# Patient Record
Sex: MALE | Race: WHITE | Employment: UNEMPLOYED | URBAN - METROPOLITAN AREA
[De-identification: names, ages, dates, MRNs, and addresses within clinical notes are randomized per-mention and may not be internally consistent; named-entity substitution may affect disease eponyms.]

---

## 2017-02-18 ENCOUNTER — HOSPITAL ENCOUNTER (INPATIENT)
Age: 52
LOS: 3 days | Discharge: HOME | DRG: 542 | End: 2017-02-21
Attending: EMERGENCY MEDICINE | Admitting: INTERNAL MEDICINE
Payer: SELF-PAY

## 2017-02-18 ENCOUNTER — APPOINTMENT (OUTPATIENT)
Dept: GENERAL RADIOLOGY | Age: 52
DRG: 542 | End: 2017-02-18
Attending: EMERGENCY MEDICINE
Payer: SELF-PAY

## 2017-02-18 ENCOUNTER — APPOINTMENT (OUTPATIENT)
Dept: CT IMAGING | Age: 52
DRG: 542 | End: 2017-02-18
Attending: EMERGENCY MEDICINE
Payer: SELF-PAY

## 2017-02-18 ENCOUNTER — APPOINTMENT (OUTPATIENT)
Dept: CT IMAGING | Age: 52
DRG: 542 | End: 2017-02-18
Attending: INTERNAL MEDICINE
Payer: SELF-PAY

## 2017-02-18 DIAGNOSIS — M84.454A PATHOLOGIC ACETABULAR FRACTURE, INITIAL ENCOUNTER: Primary | ICD-10-CM

## 2017-02-18 DIAGNOSIS — I10 ACCELERATED ESSENTIAL HYPERTENSION: ICD-10-CM

## 2017-02-18 PROBLEM — Z72.0 TOBACCO ABUSE: Status: ACTIVE | Noted: 2017-02-18

## 2017-02-18 PROBLEM — M84.552A PATHOLOGICAL FRACTURE OF LEFT HIP DUE TO NEOPLASTIC DISEASE (HCC): Status: ACTIVE | Noted: 2017-02-18

## 2017-02-18 LAB
ALBUMIN SERPL BCP-MCNC: 2.9 G/DL (ref 3.5–5)
ALBUMIN/GLOB SERPL: 0.6 {RATIO} (ref 1.2–3.5)
ALP SERPL-CCNC: 216 U/L (ref 50–136)
ALT SERPL-CCNC: 46 U/L (ref 12–65)
ANION GAP BLD CALC-SCNC: 12 MMOL/L (ref 7–16)
AST SERPL W P-5'-P-CCNC: 66 U/L (ref 15–37)
BACTERIA SPEC CULT: NORMAL
BASOPHILS # BLD AUTO: 0 K/UL (ref 0–0.2)
BASOPHILS # BLD: 0 % (ref 0–2)
BILIRUB SERPL-MCNC: 0.5 MG/DL (ref 0.2–1.1)
BUN SERPL-MCNC: 16 MG/DL (ref 6–23)
CALCIUM SERPL-MCNC: 9 MG/DL (ref 8.3–10.4)
CHLORIDE SERPL-SCNC: 106 MMOL/L (ref 98–107)
CO2 SERPL-SCNC: 24 MMOL/L (ref 21–32)
CREAT SERPL-MCNC: 0.69 MG/DL (ref 0.8–1.5)
DIFFERENTIAL METHOD BLD: ABNORMAL
EOSINOPHIL # BLD: 0 K/UL (ref 0–0.8)
EOSINOPHIL NFR BLD: 0 % (ref 0.5–7.8)
ERYTHROCYTE [DISTWIDTH] IN BLOOD BY AUTOMATED COUNT: 15.6 % (ref 11.9–14.6)
GLOBULIN SER CALC-MCNC: 4.5 G/DL (ref 2.3–3.5)
GLUCOSE SERPL-MCNC: 156 MG/DL (ref 65–100)
HCT VFR BLD AUTO: 35.2 % (ref 41.1–50.3)
HGB BLD-MCNC: 11.3 G/DL (ref 13.6–17.2)
IMM GRANULOCYTES # BLD: 0.1 K/UL (ref 0–0.5)
LYMPHOCYTES # BLD AUTO: 11 % (ref 13–44)
LYMPHOCYTES # BLD: 1.6 K/UL (ref 0.5–4.6)
MCH RBC QN AUTO: 30.2 PG (ref 26.1–32.9)
MCHC RBC AUTO-ENTMCNC: 32.1 G/DL (ref 31.4–35)
MCV RBC AUTO: 94.1 FL (ref 79.6–97.8)
MONOCYTES # BLD: 1 K/UL (ref 0.1–1.3)
MONOCYTES NFR BLD AUTO: 7 % (ref 4–12)
NEUTS SEG # BLD: 12.1 K/UL (ref 1.7–8.2)
NEUTS SEG NFR BLD AUTO: 82 % (ref 43–78)
PLATELET # BLD AUTO: 239 K/UL (ref 150–450)
PLATELET COMMENTS,PCOM: ADEQUATE
PMV BLD AUTO: 9.9 FL (ref 10.8–14.1)
POTASSIUM SERPL-SCNC: 3.3 MMOL/L (ref 3.5–5.1)
PROT SERPL-MCNC: 7.4 G/DL (ref 6.3–8.2)
RBC # BLD AUTO: 3.74 M/UL (ref 4.23–5.67)
RBC MORPH BLD: ABNORMAL
SERVICE CMNT-IMP: NORMAL
SODIUM SERPL-SCNC: 142 MMOL/L (ref 136–145)
WBC # BLD AUTO: 14.7 K/UL (ref 4.3–11.1)
WBC MORPH BLD: ABNORMAL

## 2017-02-18 PROCEDURE — 71260 CT THORAX DX C+: CPT

## 2017-02-18 PROCEDURE — 99284 EMERGENCY DEPT VISIT MOD MDM: CPT | Performed by: EMERGENCY MEDICINE

## 2017-02-18 PROCEDURE — 85025 COMPLETE CBC W/AUTO DIFF WBC: CPT | Performed by: EMERGENCY MEDICINE

## 2017-02-18 PROCEDURE — 96361 HYDRATE IV INFUSION ADD-ON: CPT | Performed by: EMERGENCY MEDICINE

## 2017-02-18 PROCEDURE — 65610000001 HC ROOM ICU GENERAL

## 2017-02-18 PROCEDURE — 73502 X-RAY EXAM HIP UNI 2-3 VIEWS: CPT

## 2017-02-18 PROCEDURE — 73700 CT LOWER EXTREMITY W/O DYE: CPT

## 2017-02-18 PROCEDURE — 74011250636 HC RX REV CODE- 250/636: Performed by: EMERGENCY MEDICINE

## 2017-02-18 PROCEDURE — 80053 COMPREHEN METABOLIC PANEL: CPT | Performed by: EMERGENCY MEDICINE

## 2017-02-18 PROCEDURE — 74011636320 HC RX REV CODE- 636/320: Performed by: INTERNAL MEDICINE

## 2017-02-18 PROCEDURE — 74011000250 HC RX REV CODE- 250: Performed by: EMERGENCY MEDICINE

## 2017-02-18 PROCEDURE — 74011000258 HC RX REV CODE- 258: Performed by: INTERNAL MEDICINE

## 2017-02-18 PROCEDURE — 87641 MR-STAPH DNA AMP PROBE: CPT | Performed by: INTERNAL MEDICINE

## 2017-02-18 PROCEDURE — 96375 TX/PRO/DX INJ NEW DRUG ADDON: CPT | Performed by: EMERGENCY MEDICINE

## 2017-02-18 PROCEDURE — 71010 XR CHEST PORT: CPT

## 2017-02-18 PROCEDURE — 74011000250 HC RX REV CODE- 250: Performed by: INTERNAL MEDICINE

## 2017-02-18 PROCEDURE — 96374 THER/PROPH/DIAG INJ IV PUSH: CPT | Performed by: EMERGENCY MEDICINE

## 2017-02-18 PROCEDURE — 74011250637 HC RX REV CODE- 250/637: Performed by: INTERNAL MEDICINE

## 2017-02-18 PROCEDURE — 74011250636 HC RX REV CODE- 250/636: Performed by: INTERNAL MEDICINE

## 2017-02-18 PROCEDURE — 96376 TX/PRO/DX INJ SAME DRUG ADON: CPT | Performed by: EMERGENCY MEDICINE

## 2017-02-18 RX ORDER — HYDROMORPHONE HYDROCHLORIDE 1 MG/ML
1 INJECTION, SOLUTION INTRAMUSCULAR; INTRAVENOUS; SUBCUTANEOUS
Status: COMPLETED | OUTPATIENT
Start: 2017-02-18 | End: 2017-02-18

## 2017-02-18 RX ORDER — ENOXAPARIN SODIUM 100 MG/ML
40 INJECTION SUBCUTANEOUS EVERY 24 HOURS
Status: DISCONTINUED | OUTPATIENT
Start: 2017-02-18 | End: 2017-02-19

## 2017-02-18 RX ORDER — HYDRALAZINE HYDROCHLORIDE 20 MG/ML
20 INJECTION INTRAMUSCULAR; INTRAVENOUS
Status: COMPLETED | OUTPATIENT
Start: 2017-02-18 | End: 2017-02-18

## 2017-02-18 RX ORDER — HYDROMORPHONE HYDROCHLORIDE 1 MG/ML
1.5 INJECTION, SOLUTION INTRAMUSCULAR; INTRAVENOUS; SUBCUTANEOUS
Status: DISCONTINUED | OUTPATIENT
Start: 2017-02-18 | End: 2017-02-18

## 2017-02-18 RX ORDER — SODIUM CHLORIDE 0.9 % (FLUSH) 0.9 %
5-10 SYRINGE (ML) INJECTION AS NEEDED
Status: DISCONTINUED | OUTPATIENT
Start: 2017-02-18 | End: 2017-02-21 | Stop reason: HOSPADM

## 2017-02-18 RX ORDER — OXYCODONE AND ACETAMINOPHEN 10; 325 MG/1; MG/1
1 TABLET ORAL
Status: DISCONTINUED | OUTPATIENT
Start: 2017-02-18 | End: 2017-02-20

## 2017-02-18 RX ORDER — AMLODIPINE BESYLATE 5 MG/1
5 TABLET ORAL DAILY
Status: DISCONTINUED | OUTPATIENT
Start: 2017-02-18 | End: 2017-02-21 | Stop reason: HOSPADM

## 2017-02-18 RX ORDER — CLONIDINE HYDROCHLORIDE 0.1 MG/1
0.2 TABLET ORAL
Status: DISCONTINUED | OUTPATIENT
Start: 2017-02-18 | End: 2017-02-18

## 2017-02-18 RX ORDER — ONDANSETRON 2 MG/ML
4 INJECTION INTRAMUSCULAR; INTRAVENOUS
Status: COMPLETED | OUTPATIENT
Start: 2017-02-18 | End: 2017-02-18

## 2017-02-18 RX ORDER — SODIUM CHLORIDE 0.9 % (FLUSH) 0.9 %
5-10 SYRINGE (ML) INJECTION EVERY 8 HOURS
Status: DISCONTINUED | OUTPATIENT
Start: 2017-02-18 | End: 2017-02-21 | Stop reason: HOSPADM

## 2017-02-18 RX ORDER — LABETALOL HYDROCHLORIDE 5 MG/ML
20 INJECTION, SOLUTION INTRAVENOUS
Status: COMPLETED | OUTPATIENT
Start: 2017-02-18 | End: 2017-02-18

## 2017-02-18 RX ORDER — HYDROCODONE BITARTRATE AND ACETAMINOPHEN 7.5; 325 MG/1; MG/1
2 TABLET ORAL
COMMUNITY
End: 2017-02-21

## 2017-02-18 RX ORDER — SODIUM CHLORIDE 0.9 % (FLUSH) 0.9 %
10 SYRINGE (ML) INJECTION
Status: COMPLETED | OUTPATIENT
Start: 2017-02-18 | End: 2017-02-18

## 2017-02-18 RX ORDER — HYDROMORPHONE HYDROCHLORIDE 1 MG/ML
3 INJECTION, SOLUTION INTRAMUSCULAR; INTRAVENOUS; SUBCUTANEOUS
Status: DISCONTINUED | OUTPATIENT
Start: 2017-02-18 | End: 2017-02-19 | Stop reason: ALTCHOICE

## 2017-02-18 RX ADMIN — ENOXAPARIN SODIUM 40 MG: 40 INJECTION SUBCUTANEOUS at 19:21

## 2017-02-18 RX ADMIN — HYDROMORPHONE HYDROCHLORIDE 1 MG: 1 INJECTION, SOLUTION INTRAMUSCULAR; INTRAVENOUS; SUBCUTANEOUS at 16:02

## 2017-02-18 RX ADMIN — Medication 10 ML: at 22:00

## 2017-02-18 RX ADMIN — OXYCODONE HYDROCHLORIDE AND ACETAMINOPHEN 1 TABLET: 10; 325 TABLET ORAL at 19:28

## 2017-02-18 RX ADMIN — DIATRIZOATE MEGLUMINE AND DIATRIZOATE SODIUM 15 ML: 600; 100 SOLUTION ORAL; RECTAL at 18:57

## 2017-02-18 RX ADMIN — HYDRALAZINE HYDROCHLORIDE 20 MG: 20 INJECTION INTRAMUSCULAR; INTRAVENOUS at 15:19

## 2017-02-18 RX ADMIN — Medication 10 ML: at 18:39

## 2017-02-18 RX ADMIN — Medication 10 ML: at 19:22

## 2017-02-18 RX ADMIN — HYDROMORPHONE HYDROCHLORIDE 1.5 MG: 1 INJECTION, SOLUTION INTRAMUSCULAR; INTRAVENOUS; SUBCUTANEOUS at 18:34

## 2017-02-18 RX ADMIN — HYDROMORPHONE HYDROCHLORIDE 1 MG: 1 INJECTION, SOLUTION INTRAMUSCULAR; INTRAVENOUS; SUBCUTANEOUS at 17:01

## 2017-02-18 RX ADMIN — ONDANSETRON 4 MG: 2 INJECTION INTRAMUSCULAR; INTRAVENOUS at 12:49

## 2017-02-18 RX ADMIN — AMLODIPINE BESYLATE 5 MG: 5 TABLET ORAL at 19:21

## 2017-02-18 RX ADMIN — LABETALOL HYDROCHLORIDE 20 MG: 5 INJECTION, SOLUTION INTRAVENOUS at 17:21

## 2017-02-18 RX ADMIN — SODIUM CHLORIDE 100 ML: 900 INJECTION, SOLUTION INTRAVENOUS at 20:30

## 2017-02-18 RX ADMIN — Medication 10 ML: at 20:30

## 2017-02-18 RX ADMIN — HYDROMORPHONE HYDROCHLORIDE 1 MG: 1 INJECTION, SOLUTION INTRAMUSCULAR; INTRAVENOUS; SUBCUTANEOUS at 12:51

## 2017-02-18 RX ADMIN — SODIUM CHLORIDE 1000 ML: 900 INJECTION, SOLUTION INTRAVENOUS at 15:19

## 2017-02-18 RX ADMIN — HYDROMORPHONE HYDROCHLORIDE 1.5 MG: 1 INJECTION, SOLUTION INTRAMUSCULAR; INTRAVENOUS; SUBCUTANEOUS at 20:16

## 2017-02-18 RX ADMIN — IOVERSOL 100 ML: 741 INJECTION INTRA-ARTERIAL; INTRAVENOUS at 20:29

## 2017-02-18 RX ADMIN — HYDROMORPHONE HYDROCHLORIDE 1.5 MG: 1 INJECTION, SOLUTION INTRAMUSCULAR; INTRAVENOUS; SUBCUTANEOUS at 21:00

## 2017-02-18 RX ADMIN — SODIUM CHLORIDE 5 MG/HR: 900 INJECTION, SOLUTION INTRAVENOUS at 19:21

## 2017-02-18 NOTE — ED PROVIDER NOTES
Patient is a 47 y.o. male presenting with hip pain. The history is provided by the patient. Hip Injury    This is a new problem. The current episode started less than 1 hour ago. The problem occurs constantly. The problem has not changed since onset. The pain is present in the left hip. The quality of the pain is described as aching, sharp and constant. The pain is at a severity of 10/10. Associated symptoms include limited range of motion and stiffness. Pertinent negatives include no numbness, no tingling, no itching, no back pain and no neck pain. The symptoms are aggravated by movement and palpation. He has tried rest (morphine 5 mg IV per EMS) for the symptoms. The treatment provided no relief. There has been no history of extremity trauma. History reviewed. No pertinent past medical history. History reviewed. No pertinent past surgical history. History reviewed. No pertinent family history. Social History     Social History    Marital status: SINGLE     Spouse name: N/A    Number of children: N/A    Years of education: N/A     Occupational History    Not on file. Social History Main Topics    Smoking status: Current Every Day Smoker     Packs/day: 1.00    Smokeless tobacco: Not on file    Alcohol use No    Drug use: Not on file    Sexual activity: Not on file     Other Topics Concern    Not on file     Social History Narrative    No narrative on file         ALLERGIES: Review of patient's allergies indicates no known allergies. Review of Systems   Constitutional: Positive for appetite change and unexpected weight change. Negative for chills and fever. Musculoskeletal: Positive for arthralgias, myalgias and stiffness. Negative for back pain, neck pain and neck stiffness. Skin: Negative for itching. Neurological: Negative for tingling and numbness. All other systems reviewed and are negative.       Vitals:    02/18/17 1134 02/18/17 1252 02/18/17 1254   BP: (!) 239/105 (!) 238/102   Pulse: (!) 108 (!) 101    Resp: 24 22    Temp: 98.4 °F (36.9 °C)     SpO2: 100% 100%    Weight: 63.5 kg (140 lb)     Height: 5' 10\" (1.778 m)              Physical Exam   Constitutional: He is oriented to person, place, and time. He appears well-developed and well-nourished. He appears distressed. HENT:   Head: Normocephalic and atraumatic. Right Ear: Tympanic membrane and external ear normal.   Left Ear: Tympanic membrane and external ear normal.   Mouth/Throat: Oropharynx is clear and moist.   Eyes: Conjunctivae and EOM are normal. Pupils are equal, round, and reactive to light. Neck: Normal range of motion. Neck supple. No tracheal deviation present. Cardiovascular: Normal rate, regular rhythm, normal heart sounds and intact distal pulses. Exam reveals no gallop and no friction rub. No murmur heard. Pulmonary/Chest: Effort normal and breath sounds normal. No respiratory distress. He has no wheezes. Abdominal: Soft. Bowel sounds are normal. He exhibits no distension and no mass. There is no hepatosplenomegaly. There is no tenderness. There is no rebound and no guarding. Musculoskeletal: He exhibits no edema. Right shoulder: He exhibits decreased range of motion, tenderness, bony tenderness, deformity and pain. He exhibits no effusion, no spasm, normal pulse and normal strength. Lymphadenopathy:     He has no cervical adenopathy. Neurological: He is alert and oriented to person, place, and time. He has normal strength. He displays normal reflexes. No cranial nerve deficit or sensory deficit. Skin: Skin is warm and dry. No rash noted. He is not diaphoretic. No erythema. Psychiatric: He has a normal mood and affect. Nursing note and vitals reviewed.        MDM  Number of Diagnoses or Management Options     Amount and/or Complexity of Data Reviewed  Clinical lab tests: ordered and reviewed  Tests in the radiology section of CPT®: ordered and reviewed  Decide to obtain previous medical records or to obtain history from someone other than the patient: yes  Obtain history from someone other than the patient: yes  Review and summarize past medical records: yes  Discuss the patient with other providers: yes  Independent visualization of images, tracings, or specimens: yes    Risk of Complications, Morbidity, and/or Mortality  Presenting problems: high  Diagnostic procedures: high  Management options: high    Patient Progress  Patient progress: stable    ED Course       Procedures      The patient was observed in the ED. Results Reviewed:      Recent Results (from the past 24 hour(s))   CBC WITH AUTOMATED DIFF    Collection Time: 02/18/17  3:35 PM   Result Value Ref Range    WBC 14.7 (H) 4.3 - 11.1 K/uL    RBC 3.74 (L) 4.23 - 5.67 M/uL    HGB 11.3 (L) 13.6 - 17.2 g/dL    HCT 35.2 (L) 41.1 - 50.3 %    MCV 94.1 79.6 - 97.8 FL    MCH 30.2 26.1 - 32.9 PG    MCHC 32.1 31.4 - 35.0 g/dL    RDW 15.6 (H) 11.9 - 14.6 %    PLATELET 926 926 - 989 K/uL    MPV 9.9 (L) 10.8 - 14.1 FL    NEUTROPHILS 82 (H) 43 - 78 %    LYMPHOCYTES 11 (L) 13 - 44 %    MONOCYTES 7 4.0 - 12.0 %    EOSINOPHILS 0 (L) 0.5 - 7.8 %    BASOPHILS 0 0.0 - 2.0 %    ABS. NEUTROPHILS 12.1 (H) 1.7 - 8.2 K/UL    ABS. LYMPHOCYTES 1.6 0.5 - 4.6 K/UL    ABS. MONOCYTES 1.0 0.1 - 1.3 K/UL    ABS. EOSINOPHILS 0.0 0.0 - 0.8 K/UL    ABS. BASOPHILS 0.0 0.0 - 0.2 K/UL    ABS. IMM.  GRANS. 0.1 0.0 - 0.5 K/UL    RBC COMMENTS NORMOCYTIC/NORMOCHROMIC      WBC COMMENTS Result Confirmed By Smear      PLATELET COMMENTS ADEQUATE      DF AUTOMATED     METABOLIC PANEL, COMPREHENSIVE    Collection Time: 02/18/17  3:35 PM   Result Value Ref Range    Sodium 142 136 - 145 mmol/L    Potassium 3.3 (L) 3.5 - 5.1 mmol/L    Chloride 106 98 - 107 mmol/L    CO2 24 21 - 32 mmol/L    Anion gap 12 7 - 16 mmol/L    Glucose 156 (H) 65 - 100 mg/dL    BUN 16 6 - 23 MG/DL    Creatinine 0.69 (L) 0.8 - 1.5 MG/DL    GFR est AA >60 >60 ml/min/1.73m2    GFR est non-AA >60 >60 ml/min/1.73m2    Calcium 9.0 8.3 - 10.4 MG/DL    Bilirubin, total 0.5 0.2 - 1.1 MG/DL    ALT (SGPT) 46 12 - 65 U/L    AST (SGOT) 66 (H) 15 - 37 U/L    Alk. phosphatase 216 (H) 50 - 136 U/L    Protein, total 7.4 6.3 - 8.2 g/dL    Albumin 2.9 (L) 3.5 - 5.0 g/dL    Globulin 4.5 (H) 2.3 - 3.5 g/dL    A-G Ratio 0.6 (L) 1.2 - 3.5       XR CHEST PORT   Final Result   Impression:   Deformity and suspected lucencies along the posterior margin left   sixth rib. Given the findings involving the left pelvis, the possibility of a   metastatic lesion would have to be considered. CT LOW EXT LT WO CONT   Final Result   IMPRESSION:  Moth-eaten lesion in the acetabulum with protrusio. Malignancy,   possibly metastatic disease suspected. Infection would be considered unlikely as   the femoral head remains intact. XR HIP LT W OR WO PELV 2-3 VWS   Final Result   IMPRESSION:  Left femoral head is dislocated or severely impacted. CT scan may   be helpful.

## 2017-02-18 NOTE — ED TRIAGE NOTES
Pt arrive via EMS, coming from work, slipped on glue on the floor and did a split. C/o left hip pain. Pt states has been going to chiropractic for same hip. EMSgave 5mg morphine. , 180/120.

## 2017-02-18 NOTE — IP AVS SNAPSHOT
Summary of Care Report The Summary of Care report has been created to help improve care coordination. Users with access to [employer] or [address] (Web-based application) may access additional patient information including the Discharge Summary. If you are not currently a [address] user and need more information, please call the number listed below in the [place] [address] section and ask to be connected with Medical Records. Facility Information Name Address Phone [address] [address] [address] [phone] Patient Information Patient Name Sex LUCY Garrett ([id number]) Male 1965 Discharge Information Admitting Provider Service Area Unit Sergio Garcia MD / [facility] [address] / [phone] Discharge Provider Discharge Date/Time Discharge Disposition Destination (none) 2017 (Pending) AHR (none) Patient Language Language ENGLISH [13] Problem List as of 2017  Never Reviewed Codes Priority Class Noted - Resolved * (Principal)Accelerated hypertension ICD-10-CM: I10 
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 Jarvis Billings MD  
  Likely malignancy. Biopsy results pending. Portal vein thrombosis ICD-10-CM: U20 
ICD-9-CM: 957   2017 - Present You are allergic to the following No active allergies Current Discharge Medication List  
  
START taking these medications Dose & Instructions Dispensing Information Comments  
 amLODIPine 5 mg tablet Commonly known as:  Анна Ross Dose:  5 mg Take 1 Tab by mouth daily. Quantity:  30 Tab Refills:  0  
   
 enoxaparin 60 mg/0.6 mL injection Commonly known as:  LOVENOX Notes to Patient:  Due @ 1600 Dose:  90 mg  
90 mg by SubCUTAneous route daily. Indications: Portal vein thrombosis with presumed malignancy Quantity:  1 Syringe Refills:  0  
   
 ibuprofen 400 mg tablet Commonly known as:  MOTRIN Notes to Patient:  As needed for fever Dose:  400 mg Take 1 Tab by mouth every six (6) hours as needed. Indications: Fever Quantity:  30 Tab Refills:  0  
   
 labetalol 100 mg tablet Commonly known as:  Mary Bump Dose:  100 mg Take 1 Tab by mouth two (2) times a day. Indications: hypertension Quantity:  60 Tab Refills:  0  
   
 morphine IR 30 mg tablet Commonly known as:  MS IR Dose:  30 mg Take 1 Tab by mouth every four (4) hours. Max Daily Amount: 180 mg. Indications: Severe Pain Quantity:  42 Tab Refills:  0  
   
 senna-docusate 8.6-50 mg per tablet Commonly known as:  Reggy Tramaine Dose:  2 Tab Take 2 Tabs by mouth daily. Indications: Constipation Quantity:  60 Tab Refills:  0 STOP taking these medications Comments HYDROcodone-acetaminophen 7.5-325 mg per tablet Commonly known as:  Jennifer Kirkland Current Immunizations Name Date Influenza Vaccine (Quad) PF  Deferred () Follow-up Information Follow up With Details Comments Contact Info None   None ([phone]) Patient stated that they have no PCP [facility] will contact you within the next 1-2 days for an appointment. Lynn Cantu [address] 
[phone] Discharge Instructions DISCHARGE SUMMARY from Nurse The following personal items are in your possession at time of discharge: 
 
Dental Appliances: None Visual Aid: None Home Medications: None Jewelry: Ring, With patient Clothing: At bedside, Footwear, Pants, [address], [address], [place] Other Valuables: At bedside, Cell Phone Personal Items Sent to Safe: none PATIENT INSTRUCTIONS: 
 
After general anesthesia or intravenous sedation, for 24 hours or while taking prescription Narcotics: · Limit your activities · Do not drive and operate hazardous machinery · Do not make important personal or business decisions · Do  not drink alcoholic beverages · If you have not urinated within 8 hours after discharge, please contact your surgeon on call. Report the following to your surgeon: 
· Excessive pain, swelling, redness or odor of or around the surgical area · Temperature over 100.5 · Nausea and vomiting lasting longer than 4 hours or if unable to take medications · Any signs of decreased circulation or nerve impairment to extremity: change in color, persistent  numbness, tingling, coldness or increase pain · Any questions What to do at Home: 
Recommended activity: Activity as tolerated, non weight bearing left leg. If you experience any of the following symptoms fever > 100.5, persistent nausea and vomiting, new or unrelieved pain, dizziness, chest pain, shortness of breath, elevated BP, please follow up with MD. 
 
 
*  Please give a list of your current medications to your Primary Care Provider. *  Please update this list whenever your medications are discontinued, doses are 
    changed, or new medications (including over-the-counter products) are added. *  Please carry medication information at all times in case of emergency situations. These are general instructions for a healthy lifestyle: No smoking/ No tobacco products/ Avoid exposure to second hand smoke Surgeon General's Warning:  Quitting smoking now greatly reduces serious risk to your health. Obesity, smoking, and sedentary lifestyle greatly increases your risk for illness A healthy diet, regular physical exercise & weight monitoring are important for maintaining a healthy lifestyle You may be retaining fluid if you have a history of heart failure or if you experience any of the following symptoms:  Weight gain of 3 pounds or more overnight or 5 pounds in a week, increased swelling in our hands or feet or shortness of breath while lying flat in bed. Please call your doctor as soon as you notice any of these symptoms; do not wait until your next office visit. Recognize signs and symptoms of STROKE: 
 
F-face looks uneven A-arms unable to move or move unevenly S-speech slurred or non-existent T-time-call 911 as soon as signs and symptoms begin-DO NOT go Back to bed or wait to see if you get better-TIME IS BRAIN. Warning Signs of HEART ATTACK Call 911 if you have these symptoms: 
? Chest discomfort. Most heart attacks involve discomfort in the center of the chest that lasts more than a few minutes, or that goes away and comes back. It can feel like uncomfortable pressure, squeezing, fullness, or pain. ? Discomfort in other areas of the upper body. Symptoms can include pain or discomfort in one or both arms, the back, neck, jaw, or stomach. ? Shortness of breath with or without chest discomfort. ? Other signs may include breaking out in a cold sweat, nausea, or lightheadedness. Don't wait more than five minutes to call [address]! Fast action can save your life. Calling 911 is almost always the fastest way to get lifesaving treatment. Emergency Medical Services staff can begin treatment when they arrive  up to an hour sooner than if someone gets to the hospital by car. The discharge information has been reviewed with the patient.   The patient verbalized understanding. Discharge medications reviewed with the patient and appropriate educational materials and side effects teaching were provided. Chart Review Routing History No Routing History on File

## 2017-02-18 NOTE — H&P
HOSPITALIST H&P      NAME:  Hannah Foley   Age:  47 y.o.  :   1965   MRN:   [id number]    PCP: None    Attending MD: Riley Bowens MD    Treatment Team: Attending Provider: Riley Bowens MD    HPI:     Hannah Foley is a 50yoM with HTN (ran out of his unknown anti-HTN about 4 mths ago) who presented to [facility] ED after slipping at work and falling \"in the splits\". He came in with significant L hip pain and inability to bear weight. He has been having significant pain in the L hip for about 5mths and has been seeing a chiropractor about it for 2-3 weeks. He also has had associated numbness in his L foot for about a month and unsteadiness on his L leg for a few months. In the ED, he was found to have SBP in the 230s. Hip CT showed \"moth eaten lesion in the acetabulum\" concerning for metastatic disease. He endorses 30lb weight loss and night sweats for the last 6 mths. Denies SOB, CP, abd pain, nausea. Complete ROS done and is as stated in HPI or otherwise negative    Past Medical History   Diagnosis Date    HTN (hypertension)         History reviewed. No pertinent past surgical history.      None       No Known Allergies     Social History   Substance Use Topics    Smoking status: Current Every Day Smoker     Packs/day: 0.75     Years: 35.00    Smokeless tobacco: Not on file    Alcohol use Yes      Comment: 2-3 drinks a week        Family History   Problem Relation Age of Onset    Cancer Mother         Objective:       Visit Vitals    BP (!) 207/88 (BP 1 Location: Right arm, BP Patient Position: At rest)    Pulse 83    Temp 98.3 °F (36.8 °C)    Resp 28    Ht 5' 10\" (1.778 m)    Wt 63.5 kg (140 lb)    SpO2 98%    BMI 20.09 kg/m2        Temp (24hrs), Av.3 °F (36.8 °C), Min:98.3 °F (36.8 °C), Max:98.4 °F (36.9 °C)      Oxygen Therapy  O2 Sat (%): 98 % (17)  Pulse via Oximetry: 84 beats per minute (17)  O2 Device: Room air (17)      Physical Exam:  General: Alert, cooperative, uncomfortable    Eyes:   Pinpoint pupils, Anicteric  Head:   Normocephalic, without obvious abnormality, atraumatic. ENT:  Poor dentition, OP clear  Lungs:   Clear to auscultation bilaterally. Normal resp effort  Heart:   RRR  Abdomen:   Soft, NTTP  MSK:  Pain with any movement of L leg  Skin:     Texture, turgor normal. Multiple tattoos  Neurologic: Alert and oriented x 3, sensation deficit of L foot  Psychiatry:      Mood congruent for context. Heme/Lymph/Immune: No petechiae, echymoses, overt signs of bleeding or lymphadenopathy. Data Review:   Recent Results (from the past 24 hour(s))   CBC WITH AUTOMATED DIFF    Collection Time: 02/18/17  3:35 PM   Result Value Ref Range    WBC 14.7 (H) 4.3 - 11.1 K/uL    RBC 3.74 (L) 4.23 - 5.67 M/uL    HGB 11.3 (L) 13.6 - 17.2 g/dL    HCT 35.2 (L) 41.1 - 50.3 %    MCV 94.1 79.6 - 97.8 FL    MCH 30.2 26.1 - 32.9 PG    MCHC 32.1 31.4 - 35.0 g/dL    RDW 15.6 (H) 11.9 - 14.6 %    PLATELET 239 833 - 683 K/uL    MPV 9.9 (L) 10.8 - 14.1 FL    NEUTROPHILS 82 (H) 43 - 78 %    LYMPHOCYTES 11 (L) 13 - 44 %    MONOCYTES 7 4.0 - 12.0 %    EOSINOPHILS 0 (L) 0.5 - 7.8 %    BASOPHILS 0 0.0 - 2.0 %    ABS. NEUTROPHILS 12.1 (H) 1.7 - 8.2 K/UL    ABS. LYMPHOCYTES 1.6 0.5 - 4.6 K/UL    ABS. MONOCYTES 1.0 0.1 - 1.3 K/UL    ABS. EOSINOPHILS 0.0 0.0 - 0.8 K/UL    ABS. BASOPHILS 0.0 0.0 - 0.2 K/UL    ABS. IMM.  GRANS. 0.1 0.0 - 0.5 K/UL    RBC COMMENTS NORMOCYTIC/NORMOCHROMIC      WBC COMMENTS Result Confirmed By Smear      PLATELET COMMENTS ADEQUATE      DF AUTOMATED     METABOLIC PANEL, COMPREHENSIVE    Collection Time: 02/18/17  3:35 PM   Result Value Ref Range    Sodium 142 136 - 145 mmol/L    Potassium 3.3 (L) 3.5 - 5.1 mmol/L    Chloride 106 98 - 107 mmol/L    CO2 24 21 - 32 mmol/L    Anion gap 12 7 - 16 mmol/L    Glucose 156 (H) 65 - 100 mg/dL    BUN 16 6 - 23 MG/DL    Creatinine 0.69 (L) 0.8 - 1.5 MG/DL    GFR est AA >60 >60 ml/min/1.73m2    GFR est non-AA >60 >60 ml/min/1.73m2    Calcium 9.0 8.3 - 10.4 MG/DL    Bilirubin, total 0.5 0.2 - 1.1 MG/DL    ALT (SGPT) 46 12 - 65 U/L    AST (SGOT) 66 (H) 15 - 37 U/L    Alk. phosphatase 216 (H) 50 - 136 U/L    Protein, total 7.4 6.3 - 8.2 g/dL    Albumin 2.9 (L) 3.5 - 5.0 g/dL    Globulin 4.5 (H) 2.3 - 3.5 g/dL    A-G Ratio 0.6 (L) 1.2 - 3.5         Imaging /Procedures /Studies   XR CHEST PORT   Final Result   Impression:   Deformity and suspected lucencies along the posterior margin left   sixth rib. Given the findings involving the left pelvis, the possibility of a   metastatic lesion would have to be considered. CT LOW EXT LT WO CONT   Final Result   IMPRESSION:  Moth-eaten lesion in the acetabulum with protrusio. Malignancy,   possibly metastatic disease suspected. Infection would be considered unlikely as   the femoral head remains intact. XR HIP LT W OR WO PELV 2-3 VWS   Final Result   IMPRESSION:  Left femoral head is dislocated or severely impacted. CT scan may   be helpful. 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 Will start cardene gtt. Goal . Also will start norvasc, but might take time to see effect.    - Pathologic hip fx: unknown primary. ED physician spoke with Dr. Alisson Ridley. No acute intervention. If no primary found, he offered to bx the lesion. Otherwise, will need followup with S, Dr. Rell Weinstein, who is an orthopedic oncologist.    - Hip pain: dilaudid prn overnight. If pain continues to be severe, may need longterm pain management. Consulted PT, as may need assistive walking device 2/2 pain    - Unknown malignancy: CT chest/abd/pelvis ordered.  Once clinical picture is more clear, will likely need onc consult.       Code Status: FULL  DVT Prophylaxis: Lovenox    Anticipated discharge: 2-3 days      Shanae Anders MD  5:51 PM

## 2017-02-18 NOTE — IP AVS SNAPSHOT
[address] 
[phone] Patient: Wan Castanon MRN: [id number] :1965 You are allergic to the following No active allergies Immunizations Administered for This Admission Name Date Influenza Vaccine (Quad) PF  Deferred () Recent Documentation Height Weight BMI Smoking Status 1.778 m 63.4 kg 20.06 kg/m2 Current Every Day Smoker Emergency Contacts Name Discharge Info Relation Home Work Mobile Johana Trinidad [5] [phone] About your hospitalization You were admitted on:  2017 You last received care in the:  [address] You were discharged on:  2017 Unit phone number:  [phone] Why you were hospitalized Your primary diagnosis was:  Accelerated Hypertension Your diagnoses also included:  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), Tobacco Abuse, Liver Mass, Left Lobe, Portal Vein Thrombosis Providers Seen During Your Hospitalizations Provider Role Specialty Primary office phone Pb Chase MD Attending Provider Emergency Medicine [phone] Homer Hernández MD Attending Provider Internal Medicine [phone] Your Primary Care Physician (PCP) Primary Care Physician Office Phone Office Fax NONE ** None ** ** None ** Follow-up Information Follow up With Details Comments Contact Info None   None ([phone]) Patient stated that they have no PCP [address] [place] [address] will contact you within the next 1-2 days for an appointment. Lynn Cantu 99 
[phone] Current Discharge Medication List  
  
START taking these medications Dose & Instructions Dispensing Information Comments Morning Noon Evening Bedtime amLODIPine 5 mg tablet Commonly known as:  Justine Walls Your next dose is:  Tomorrow Dose:  5 mg Take 1 Tab by mouth daily. Quantity:  30 Tab Refills:  0  
     
  
   
   
   
  
 enoxaparin 60 mg/0.6 mL injection Commonly known as:  LOVENOX Your next dose is: Today Notes to Patient:  Due @ 1600 Dose:  90 mg  
90 mg by SubCUTAneous route daily. Indications: Portal vein thrombosis with presumed malignancy Quantity:  1 Syringe Refills:  0  
     
   
   
  
   
  
 ibuprofen 400 mg tablet Commonly known as:  MOTRIN Notes to Patient:  As needed for fever Dose:  400 mg Take 1 Tab by mouth every six (6) hours as needed. Indications: Fever Quantity:  30 Tab Refills:  0  
     
   
   
   
  
 labetalol 100 mg tablet Commonly known as:  Ankush Ace Your next dose is: Today Dose:  100 mg Take 1 Tab by mouth two (2) times a day. Indications: hypertension Quantity:  60 Tab Refills:  0  
     
   
   
  
   
  
 morphine IR 30 mg tablet Commonly known as:  MS IR Dose:  30 mg Take 1 Tab by mouth every four (4) hours. Max Daily Amount: 180 mg. Indications: Severe Pain Quantity:  42 Tab Refills:  0  
     
   
   
4 pm 
 
  
   
  
 senna-docusate 8.6-50 mg per tablet Commonly known as:  Benito Pal Your next dose is: Today Dose:  2 Tab Take 2 Tabs by mouth daily. Indications: Constipation Quantity:  60 Tab Refills:  0 STOP taking these medications HYDROcodone-acetaminophen 7.5-325 mg per tablet Commonly known as:  Virginie Mura Where to Get Your Medications Information on where to get these meds will be given to you by the nurse or doctor. ! Ask your nurse or doctor about these medications  
  amLODIPine 5 mg tablet  
 enoxaparin 60 mg/0.6 mL injection  
 ibuprofen 400 mg tablet  
 labetalol 100 mg tablet  
 morphine IR 30 mg tablet  
 senna-docusate 8.6-50 mg per tablet Discharge Instructions DISCHARGE SUMMARY from Nurse The following personal items are in your possession at time of discharge: 
 
Dental Appliances: None Visual Aid: None Home Medications: None Jewelry: Ring, With patient Clothing: At bedside, Footwear, Pants, [address], [address], Alex Other Valuables: At bedside, Cell Phone Personal Items Sent to Safe: none PATIENT INSTRUCTIONS: 
 
After general anesthesia or intravenous sedation, for 24 hours or while taking prescription Narcotics: · Limit your activities · Do not drive and operate hazardous machinery · Do not make important personal or business decisions · Do  not drink alcoholic beverages · If you have not urinated within 8 hours after discharge, please contact your surgeon on call. Report the following to your surgeon: 
· Excessive pain, swelling, redness or odor of or around the surgical area · Temperature over 100.5 · Nausea and vomiting lasting longer than 4 hours or if unable to take medications · Any signs of decreased circulation or nerve impairment to extremity: change in color, persistent  numbness, tingling, coldness or increase pain · Any questions What to do at Home: 
Recommended activity: Activity as tolerated, non weight bearing left leg. If you experience any of the following symptoms fever > 100.5, persistent nausea and vomiting, new or unrelieved pain, dizziness, chest pain, shortness of breath, elevated BP, please follow up with MD. 
 
 
*  Please give a list of your current medications to your Primary Care Provider. *  Please update this list whenever your medications are discontinued, doses are 
    changed, or new medications (including over-the-counter products) are added. *  Please carry medication information at all times in case of emergency situations. These are general instructions for a healthy lifestyle: No smoking/ No tobacco products/ Avoid exposure to second hand smoke Surgeon General's Warning:  Quitting smoking now greatly reduces serious risk to your health. Obesity, smoking, and sedentary lifestyle greatly increases your risk for illness A healthy diet, regular physical exercise & weight monitoring are important for maintaining a healthy lifestyle You may be retaining fluid if you have a history of heart failure or if you experience any of the following symptoms:  Weight gain of 3 pounds or more overnight or 5 pounds in a week, increased swelling in our hands or feet or shortness of breath while lying flat in bed. Please call your doctor as soon as you notice any of these symptoms; do not wait until your next office visit. Recognize signs and symptoms of STROKE: 
 
F-face looks uneven A-arms unable to move or move unevenly S-speech slurred or non-existent T-time-call 911 as soon as signs and symptoms begin-DO NOT go Back to bed or wait to see if you get better-TIME IS BRAIN. Warning Signs of HEART ATTACK Call 911 if you have these symptoms: 
? Chest discomfort. Most heart attacks involve discomfort in the center of the chest that lasts more than a few minutes, or that goes away and comes back. It can feel like uncomfortable pressure, squeezing, fullness, or pain. ? Discomfort in other areas of the upper body. Symptoms can include pain or discomfort in one or both arms, the back, neck, jaw, or stomach. ? Shortness of breath with or without chest discomfort. ? Other signs may include breaking out in a cold sweat, nausea, or lightheadedness. Don't wait more than five minutes to call [address]! Fast action can save your life. Calling 911 is almost always the fastest way to get lifesaving treatment. Emergency Medical Services staff can begin treatment when they arrive  up to an hour sooner than if someone gets to the hospital by car. The discharge information has been reviewed with the patient. The patient verbalized understanding. Discharge medications reviewed with the patient and appropriate educational materials and side effects teaching were provided. Discharge Orders None [employer] Announcement We are excited to announce that we are making your provider's discharge notes available to you in [employer]. You will see these notes when they are completed and signed by the physician that discharged you from your recent hospital stay. If you have any questions or concerns about any information you see in [employer], please call the Health Information Department where you were seen or reach out to your Primary Care Provider for more information about your plan of care. Introducing [facility]! [employer] introduces [employer] patient portal. Now you can access parts of your medical record, email your doctor's office, and request medication refills online. 1. In your internet browser, go to [email or web address] 2. Click on the First Time User? Click Here link in the Sign In box. You will see the New Member Sign Up page. 3. Enter your [employer] Access Code exactly as it appears below. You will not need to use this code after youve completed the sign-up process. If you do not sign up before the expiration date, you must request a new code. · [employer] Access Code: [email or web address] Expires: 5/19/2017 11:34 AM 
 
4. Enter the last four digits of your Social Security Number (xxxx) and Date of Birth (mm/dd/yyyy) as indicated and click Submit. You will be taken to the next sign-up page. 5. Create a [employer] ID. This will be your [employer] login ID and cannot be changed, so think of one that is secure and easy to remember. 6. Create a [employer] password. You can change your password at any time. 7. Enter your Password Reset Question and Answer.  This can be used at a later time if you forget your password. 8. Enter your e-mail address. You will receive e-mail notification when new information is available in [address] 9. Click Sign Up. You can now view and download portions of your medical record. 10. Click the Download Summary menu link to download a portable copy of your medical information. If you have questions, please visit the Frequently Asked Questions section of the [employer] website. Remember, [employer] is NOT to be used for urgent needs. For medical emergencies, dial 911. Now available from your iPhone and Android! General Information Please provide this summary of care documentation to your next provider. Patient Signature:  ____________________________________________________________ Date:  ____________________________________________________________  
  
Jessie Sleight Provider Signature:  ____________________________________________________________ Date:  ____________________________________________________________

## 2017-02-18 NOTE — ED NOTES
TRANSFER - OUT REPORT:    Verbal report given to [facility], RN on Aylin Cross  being transferred to ICU for routine progression of care       Report consisted of patients Situation, Background, Assessment and   Recommendations(SBAR). Information from the following report(s) SBAR and ED Summary was reviewed with the receiving nurse. Lines:   Peripheral IV 02/18/17 Left Antecubital (Active)       Peripheral IV 02/18/17 (Active)   Site Assessment Clean, dry, & intact 2/18/2017 11:53 AM   Phlebitis Assessment 0 2/18/2017 11:53 AM   Infiltration Assessment 0 2/18/2017 11:53 AM   Dressing Status Clean, dry, & intact 2/18/2017 11:53 AM   Dressing Type Transparent 2/18/2017 11:53 AM        Opportunity for questions and clarification was provided.       Patient transported with:   Monitor   RN

## 2017-02-19 LAB
ANION GAP BLD CALC-SCNC: 10 MMOL/L (ref 7–16)
BUN SERPL-MCNC: 10 MG/DL (ref 6–23)
CALCIUM SERPL-MCNC: 8.7 MG/DL (ref 8.3–10.4)
CHLORIDE SERPL-SCNC: 104 MMOL/L (ref 98–107)
CO2 SERPL-SCNC: 25 MMOL/L (ref 21–32)
CREAT SERPL-MCNC: 0.58 MG/DL (ref 0.8–1.5)
ERYTHROCYTE [DISTWIDTH] IN BLOOD BY AUTOMATED COUNT: 16 % (ref 11.9–14.6)
GLUCOSE SERPL-MCNC: 122 MG/DL (ref 65–100)
HCT VFR BLD AUTO: 34.7 % (ref 41.1–50.3)
HGB BLD-MCNC: 11 G/DL (ref 13.6–17.2)
MCH RBC QN AUTO: 30.2 PG (ref 26.1–32.9)
MCHC RBC AUTO-ENTMCNC: 31.7 G/DL (ref 31.4–35)
MCV RBC AUTO: 95.3 FL (ref 79.6–97.8)
PLATELET # BLD AUTO: 226 K/UL (ref 150–450)
PMV BLD AUTO: 10 FL (ref 10.8–14.1)
POTASSIUM SERPL-SCNC: 3.7 MMOL/L (ref 3.5–5.1)
RBC # BLD AUTO: 3.64 M/UL (ref 4.23–5.67)
SODIUM SERPL-SCNC: 139 MMOL/L (ref 136–145)
WBC # BLD AUTO: 12.7 K/UL (ref 4.3–11.1)

## 2017-02-19 PROCEDURE — 36415 COLL VENOUS BLD VENIPUNCTURE: CPT | Performed by: INTERNAL MEDICINE

## 2017-02-19 PROCEDURE — 74011250637 HC RX REV CODE- 250/637: Performed by: INTERNAL MEDICINE

## 2017-02-19 PROCEDURE — 65270000029 HC RM PRIVATE

## 2017-02-19 PROCEDURE — 74011250636 HC RX REV CODE- 250/636: Performed by: INTERNAL MEDICINE

## 2017-02-19 PROCEDURE — 80048 BASIC METABOLIC PNL TOTAL CA: CPT | Performed by: INTERNAL MEDICINE

## 2017-02-19 PROCEDURE — 74011250636 HC RX REV CODE- 250/636: Performed by: HOSPITALIST

## 2017-02-19 PROCEDURE — 77030019938 HC TBNG IV PCA ICUM -A

## 2017-02-19 PROCEDURE — 85027 COMPLETE CBC AUTOMATED: CPT | Performed by: INTERNAL MEDICINE

## 2017-02-19 RX ORDER — LABETALOL 100 MG/1
100 TABLET, FILM COATED ORAL 2 TIMES DAILY
Status: DISCONTINUED | OUTPATIENT
Start: 2017-02-19 | End: 2017-02-21 | Stop reason: HOSPADM

## 2017-02-19 RX ORDER — ENOXAPARIN SODIUM 100 MG/ML
60 INJECTION SUBCUTANEOUS EVERY 12 HOURS
Status: DISCONTINUED | OUTPATIENT
Start: 2017-02-19 | End: 2017-02-21 | Stop reason: HOSPADM

## 2017-02-19 RX ORDER — LABETALOL 200 MG/1
200 TABLET, FILM COATED ORAL 2 TIMES DAILY
Status: DISCONTINUED | OUTPATIENT
Start: 2017-02-19 | End: 2017-02-19

## 2017-02-19 RX ADMIN — HYDROMORPHONE HYDROCHLORIDE: 2 INJECTION INTRAMUSCULAR; INTRAVENOUS; SUBCUTANEOUS at 21:30

## 2017-02-19 RX ADMIN — Medication 10 ML: at 13:12

## 2017-02-19 RX ADMIN — OXYCODONE HYDROCHLORIDE AND ACETAMINOPHEN 1 TABLET: 10; 325 TABLET ORAL at 08:20

## 2017-02-19 RX ADMIN — OXYCODONE HYDROCHLORIDE AND ACETAMINOPHEN 1 TABLET: 10; 325 TABLET ORAL at 17:53

## 2017-02-19 RX ADMIN — HYDROMORPHONE HYDROCHLORIDE 3 MG: 1 INJECTION, SOLUTION INTRAMUSCULAR; INTRAVENOUS; SUBCUTANEOUS at 01:30

## 2017-02-19 RX ADMIN — LABETALOL HCL 100 MG: 100 TABLET, FILM COATED ORAL at 09:49

## 2017-02-19 RX ADMIN — Medication 10 ML: at 21:37

## 2017-02-19 RX ADMIN — Medication 10 ML: at 04:32

## 2017-02-19 RX ADMIN — OXYCODONE HYDROCHLORIDE AND ACETAMINOPHEN 1 TABLET: 10; 325 TABLET ORAL at 13:16

## 2017-02-19 RX ADMIN — HYDROMORPHONE HYDROCHLORIDE 2 MG: 1 INJECTION, SOLUTION INTRAMUSCULAR; INTRAVENOUS; SUBCUTANEOUS at 10:26

## 2017-02-19 RX ADMIN — HYDROMORPHONE HYDROCHLORIDE: 2 INJECTION INTRAMUSCULAR; INTRAVENOUS; SUBCUTANEOUS at 10:23

## 2017-02-19 RX ADMIN — HYDROMORPHONE HYDROCHLORIDE 3 MG: 1 INJECTION, SOLUTION INTRAMUSCULAR; INTRAVENOUS; SUBCUTANEOUS at 08:20

## 2017-02-19 RX ADMIN — OXYCODONE HYDROCHLORIDE AND ACETAMINOPHEN 1 TABLET: 10; 325 TABLET ORAL at 21:43

## 2017-02-19 RX ADMIN — LABETALOL HCL 100 MG: 100 TABLET, FILM COATED ORAL at 21:36

## 2017-02-19 RX ADMIN — AMLODIPINE BESYLATE 5 MG: 5 TABLET ORAL at 08:20

## 2017-02-19 RX ADMIN — HYDROMORPHONE HYDROCHLORIDE 3 MG: 1 INJECTION, SOLUTION INTRAMUSCULAR; INTRAVENOUS; SUBCUTANEOUS at 04:30

## 2017-02-19 NOTE — PROGRESS NOTES
Pt transported to room 501, accompanied by this RN. Dual verification of dilaudid pca valencia/Franc, Primary RN with rate change noted per MD orders. Pt c/o constant pain to left hip area, radiating down left leg, rating 10/10. Positioned in bed for comfort. Call light in reach.

## 2017-02-19 NOTE — PROGRESS NOTES
Back from CT pt shaking in pain. After 2 doses of dilaudid 1.5 mg and 1 percocet pain is still a 10/10. Dr Kavita Cunningham notified and pain med changed. Order to give an additional 1.5mg received.

## 2017-02-19 NOTE — PROGRESS NOTES
Bedside and Verbal shift change report given to Vladislav Chavez RN (oncoming nurse) by Estefania Hilliard RN (offgoing nurse). Report included the following information SBAR, Kardex, ED Summary, Intake/Output, MAR, Recent Results and Cardiac Rhythm NSR. Dual skin assessment reviewed. Pt resting quietly in bed, in NAD. NSR on monitor. SBP 160s off cardene gtt. Still with constant left hip pain 10/10. Being medicated prn per MAR. No needs voiced at this time. Call light in reach.

## 2017-02-19 NOTE — PROGRESS NOTES
TRANSFER - OUT REPORT:    Verbal report given to Bhupinder Ruiz RN on [place]  being transferred to [facility] [phone] for routine progression of care       Report consisted of patients Situation, Background, Assessment and   Recommendations(SBAR). Information from the following report(s) SBAR, Kardex, ED Summary, Intake/Output, MAR, Recent Results and Cardiac Rhythm NSR was reviewed with the receiving nurse. Lines:   Peripheral IV 02/18/17 Left Antecubital (Active)   Site Assessment Clean, dry, & intact 2/19/2017  8:20 AM   Phlebitis Assessment 0 2/19/2017  8:20 AM   Infiltration Assessment 0 2/19/2017  8:20 AM   Dressing Status Clean, dry, & intact 2/19/2017  8:20 AM   Dressing Type Tape;Transparent 2/19/2017  8:20 AM   Hub Color/Line Status Green;Capped;Flushed;Patent 2/19/2017  8:20 AM   Alcohol Cap Used No 2/19/2017  8:20 AM       Peripheral IV 02/18/17 (Active)   Site Assessment Clean, dry, & intact 2/19/2017  8:20 AM   Phlebitis Assessment 0 2/19/2017  8:20 AM   Infiltration Assessment 0 2/19/2017  8:20 AM   Dressing Status Clean, dry, & intact 2/19/2017  8:20 AM   Dressing Type Tape;Transparent 2/19/2017  8:20 AM   Hub Color/Line Status Green;Capped;Flushed;Patent 2/19/2017  8:20 AM   Alcohol Cap Used No 2/19/2017  8:20 AM        Opportunity for questions and clarification was provided.       Patient transported with:   O2 @ 2 liters

## 2017-02-19 NOTE — PROGRESS NOTES
PT note: Evaluation orders received and chart reviewed. Per chart review, pt with left hip pain and unsteadiness for the last several months which is much worse now after a fall at work. CT shows lesion in acetabulum concerning for metastatic disease; x-ray shows left femoral head dislocated or severely impacted. Mr. Suzanne Blas is currently in severe pain (10/10) despite medications and is awaiting oncology consult today to determine plan of care. RN suggests holding therapy assessment at this time. Will check back tomorrow. Thank you.      JOHANNA SerraT

## 2017-02-19 NOTE — PROGRESS NOTES
Hospitalist Progress Note    2017  Admit Date: 2017 11:38 AM   NAME: Fer Victoria   :  1965   MRN:  [id number]   Attending: Valentina Mansfield MD  PCP:  None      Admitted for: Hypertensive emergency - fractured left hip with concern for cancer    SUBJECTIVE:   As Previously documented: \" Fer Victoria is a 50yoM with HTN (ran out of his unknown anti-HTN about 4 mths ago) who presented to [facility] ED after slipping at work and falling \"in the splits\". He came in with significant L hip pain and inability to bear weight. He has been having significant pain in the L hip for about 5mths and has been seeing a chiropractor about it for 2-3 weeks. He also has had associated numbness in his L foot for about a month and unsteadiness on his L leg for a few months. In the ED, he was found to have SBP in the 230s. Hip CT showed \"moth eaten lesion in the acetabulum\" concerning for metastatic disease. He endorses 30lb weight loss and night sweats for the last 6 mths. Denies SOB, CP, abd pain, nausea. \"       2017- seen no new complaints. Feeling better aware of diagnosis. Does not want to . Has family coming. Unfortunately, he recently lost his wife in nov last year to colon cancer. Hospital Course  Pt admitted and placed in ICU for HTN emergency. Nathan drip weaned off. Ct abdomen, chest, pelvis- showed liver mass. \" hepatocellular primary.  Oncology consulted 2017    Review of Systems negative with exception of pertinent positives noted above  Past medical history unchanged from H&P    PHYSICAL EXAM     Visit Vitals    /80    Pulse 86    Temp 98.6 °F (37 °C)    Resp 20    Ht 5' 10\" (1.778 m)    Wt 63.4 kg (139 lb 12.4 oz)    SpO2 98%    BMI 20.06 kg/m2      Temp (24hrs), Av.6 °F (37 °C), Min:98.3 °F (36.8 °C), Max:98.8 °F (37.1 °C)    Oxygen Therapy  O2 Sat (%): 98 % (17 0820)  Pulse via Oximetry: 85 beats per minute (17 0816)  O2 Device: Nasal cannula (17 0820)  O2 Flow Rate (L/min): 2 l/min (02/19/17 0820)  FIO2 (%): 21 % (02/19/17 0531)    Intake/Output Summary (Last 24 hours) at 02/19/17 0917  Last data filed at 02/18/17 2146   Gross per 24 hour   Intake                0 ml   Output              450 ml   Net             -450 ml        General: Thin, emaciated  mucous membranes pink and moist acyanotic anicteric  Neck:  Supple full range of motion  Lungs:  Air entry equal bilaterally, no crepitations rales rhonchi   Heart:  Regular rate and rhythm,  No murmur, rub, or gallop  Abdomen: Soft, Non distended, Non tender, no rebound guarding Positive bowel sounds  Extremities: No cyanosis, clubbing or edema  Neurologic:  No focal deficits  Musculoskeletal: no   Joint swelling tenderness erythema  Skin:  No erythema, rashes noted          LAB  No results for input(s): GLUCPOC in the last 72 hours. No lab exists for component: GLPOC   Recent Labs      02/19/17   0400   WBC  12.7*   HGB  11.0*   HCT  34.7*   PLT  226     Recent Labs      02/19/17   0400  02/18/17   1535   NA  139  142   K  3.7  3.3*   CL  104  106   CO2  25  24   GLU  122*  156*   BUN  10  16   CREA  0.58*  0.69*   CA  8.7  9.0   ALB   --   2.9*   TBILI   --   0.5   ALT   --   46   SGOT   --   66*       EKG and imaging reviewed personally by me  Xr Hip Lt W Or Wo Pelv 2-3 Vws    Result Date: 2/18/2017  LEFT HIP, 3 views. HISTORY: Severe left hip pain following fall. TECHNIQUE: AP view of the pelvis and coned down AP and frogleg lateral of the hip. FINDINGS: Left femoral head is dislocated or significantly impacted. . Patient is rotated. Unclear if this is anterior or posterior. No definite fracture. SI joints symmetric. Pubic rami probably intact. IMPRESSION:  Left femoral head is dislocated or severely impacted. CT scan may be helpful.      Ct Chest Abd Pelv W Cont    Result Date: 2/18/2017  CT scan chest, abdomen and pelvis with contrast 02/18/2017 History: Destructive process of left pelvis concerning for metastatic disease. Assess for primary. Technique: Helically acquired images were obtained from the lung apices to the ischial tuberosities reconstructed at 5 mm thickness after the uneventful administration of 125 mL of Optiray-350 and oral contrast in order to better evaluate the solid and hollow abdominal viscera and mediastinal structures. Coronal reformatted images were submitted. Radiation dose reduction techniques were used for this study:  Our CT scanners use one or all of the following: Automated exposure control, adjustment of the mA and/or kVp according to patient's size, iterative reconstruction. Comparison: None CT Chest: There is no pleural or pericardial effusion. The heart and great vessels are unremarkable. There are no pulmonary nodules or masses. No adenopathy is present. There are no bony destructive changes. CT ABDOMEN: There is an ill-defined region of decreased attenuation within the left hepatic lobe measuring approximately 5.0 x 6.2 cm. There is additional ill-defined regions of decreased attenuation within the left hepatic lobe. There is thrombosis of the left portal vein with subtotal occlusion of the right portal vein with peripheral right portal vein branches appearing patent. . The spleen, pancreas, adrenal glands and kidneys are unremarkable the exception of a subcentimeter low-density lesion at the midpole right kidney, too small to characterize. Moderate atherosclerotic changes are present involving the aorta. No bony destructive changes are present. CT PELVIS: The destructive mass is again noted involving the left acetabulum with a large soft tissue component extending into the pelvis, deforming the base of the bladder. There is a trace amount of ascites. The urinary bladder is moderately distended. No adenopathy is present. There are no additional bony destructive lesions. IMPRESSION: 1.  Infiltrative appearing mass within the left hepatic lobe with resultant portal vein occlusion, left greater than right. A primary liver neoplasm including hepatocellular carcinoma or cholangiocarcinoma could account for this appearance in addition to metastatic disease. 2. Bony destructive process involving the left acetabulum with extension into the pelvis compatible with metastatic disease. Ct Low Ext Lt Wo Cont    Result Date: 2/18/2017  CT LEFT HIP WITHOUT CONTRAST. HISTORY: Acute left hip pain. Abnormal plain films. TECHNIQUE: 2.0mm axial scans through the joint with sagittal and coronal reconstructions. FINDINGS: The entire acetabulum has a mottle moth-eaten appearance. There is soft tissue swelling on the medial side of the acetabulum. Femoral head and neck appear intact. There are displaced quite medially. There is sclerosis of the initial tuberosity. IMPRESSION:  Moth-eaten lesion in the acetabulum with protrusio. Malignancy, possibly metastatic disease suspected. Infection would be considered unlikely as the femoral head remains intact. Xr Chest Port    Result Date: 2/18/2017  Portable chest: History: acetabular fracture with possible metastatic lesion. Comparison: None Findings: A single view of the chest was obtained at 1531 hours. The cardiac and mediastinal silhouette are normal in size and configuration. The lungs and pleural spaces are clear. The pulmonary vascularity is within normal limits. There is deformity and suspected lucency along the posterior margin of the left sixth rib. Impression:   Deformity and suspected lucencies along the posterior margin left sixth rib. Given the findings involving the left pelvis, the possibility of a metastatic lesion would have to be considered. No results found for this or any previous visit. XR Results (most recent):    Results from Hospital Encounter encounter on 02/18/17   XR CHEST PORT   Narrative Portable chest:     History: acetabular fracture with possible metastatic lesion.       Comparison: None    Findings: A single view of the chest was obtained at 1531 hours. The cardiac and mediastinal silhouette are normal in size and configuration. The  lungs and pleural spaces are clear. The pulmonary vascularity is within normal  limits. There is deformity and suspected lucency along the posterior margin of  the left sixth rib. Impression Impression:   Deformity and suspected lucencies along the posterior margin left  sixth rib. Given the findings involving the left pelvis, the possibility of a  metastatic lesion would have to be considered. Active problems  Active Hospital Problems    Diagnosis Date Noted    Accelerated hypertension 02/18/2017    Pathological fracture of left hip due to neoplastic disease ([facility] [address] 02/18/2017    Tobacco abuse 02/18/2017       ASSESSMENT  AND PLAN      - Accelerated HTN: likely multifactorial 2/2 med noncompliance and severe pain. Off Cardene will add labetalol and a prn     - Pathologic hip fx: unknown primary. ED physician spoke with Dr. Jose Fox. No acute intervention. If no primary found, he offered to bx the lesion. Otherwise, will need followup with S, Dr. Raman Gorman, who is an orthopedic oncologist.    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     - Hip pain: sever will place on a PCA.  Consulted PT, as may need assistive walking device 2/2 pain       Transfer to the floor     Code Status: FULL  DVT Prophylaxis: Lovenox           Signed By: Geraldine Vega MD     February 19, 2017

## 2017-02-19 NOTE — CONSULTS
Inpatient Hematology / Oncology Consult Note    Reason for Consult:  Accelerated hypertension  Referring Physician:  Marisela Arango MD    History of Present Illness:  Mr. Ramona Roberts is a 47 y.o. male admitted on 2/18/2017 with a history of HTN but ran out of medications about 4 months ago. He presented to the ER with complaints of severe left hip pain and inability to bear weight after a fall. He has had complaints of hip pain x 5 months and has seen a chiropractor for his symptoms. CT hip showed moth eaten lesion in acetabulum\" concerning for metastatic disease. He has noted a 30lb weight loss and night sweats over the last 6 months. CT CAP was done and revealed a 5x6.2cm mass in the left hepatic lobe consistent with a primary hepatocelluar carcinoma or cholangiocarcinoma. CXR showed a questionable met in the rib. He was admitted to ICU on a cardene drip for severe HTN, systolic 999I on admission. He is in excruciating pain to hip left which is generally uncontrolled  We were consulted for further work-up and management for his highly suspicious metastatic carcinoma. He apparently lives in [facility] was was here in [place] for work. He requests to return to [facility] for treatment after discharge. He reports occasional alcohol use, heavy drinking only in his teens. He denies any IV drug use and has been cutting back on smoking, down to less than half a pack per day. Review of Systems:  Constitutional + weight loss, night sweats. Denies fever, chills    HEENT Denies trauma, blurry vision, hearing loss, ear pain, nosebleeds, sore throat, neck pain. Skin Denies lesions or rashes. Lungs Denies dyspnea, cough, sputum production or hemoptysis. Cardiovascular Denies chest pain, palpitations, or lower extremity edema. Gastrointestinal Denies nausea, vomiting, changes in bowel habits, bloody or black stools, abdominal pain.  Denies dysuria, frequency or hesitancy of urination.    Neuro Denies headaches, visual changes or ataxia. Denies dizziness, tingling, tremors, sensory change, speech change, focal weakness      Hematology Denies easy bruising or bleeding, denies gingival bleeding or epistaxis. Endo Denies heat/cold intolerance, denies diabetes or thyroid abnormalities. MSK + left hip pain, numbness down left leg. Recent fall     Psychiatric/Behavioral Denies depression and substance abuse. The patient is not nervous/anxious. No Known Allergies  Past Medical History   Diagnosis Date    HTN (hypertension)      History reviewed. No pertinent past surgical history. Family History   Problem Relation Age of Onset    Cancer Mother      Social History     Social History    Marital status: SINGLE     Spouse name: N/A    Number of children: N/A    Years of education: N/A     Occupational History    Not on file.      Social History Main Topics    Smoking status: Current Every Day Smoker     Packs/day: 0.75     Years: 35.00    Smokeless tobacco: Not on file    Alcohol use Yes      Comment: 2-3 drinks a week    Drug use: No    Sexual activity: Not on file     Other Topics Concern    Not on file     Social History Narrative    No narrative on file     Current Facility-Administered Medications   Medication Dose Route Frequency Provider Last Rate Last Dose    sodium chloride (NS) flush 5-10 mL  5-10 mL IntraVENous Ping Carlisle MD   10 mL at 02/19/17 0432    sodium chloride (NS) flush 5-10 mL  5-10 mL IntraVENous PRN Brooke Hamilton MD        sodium chloride (NS) flush 5-10 mL  5-10 mL IntraVENous Q8H Luis Armijo MD   10 mL at 02/19/17 0432    sodium chloride (NS) flush 5-10 mL  5-10 mL IntraVENous PRN Luis Armijo MD        oxyCODONE-acetaminophen (PERCOCET 10)  mg per tablet 1 Tab  1 Tab Oral Q4H PRN Luis Armijo MD   1 Tab at 02/19/17 0820    enoxaparin (LOVENOX) injection 40 mg  40 mg SubCUTAneous Q24H Luis Armijo MD   40 mg at 02/18/17 1921    niCARdipine (CARDENE) 25 mg in 0.9% sodium chloride 250 mL infusion  5-15 mg/hr IntraVENous TITRATE Shireen Kingsley MD   Stopped at 17    amLODIPine (NORVASC) tablet 5 mg  5 mg Oral DAILY Rosa Maria Elizabeth MD   5 mg at 17 0820    influenza vaccine  (36mos+)(PF) (FLUZONE/FLUARIX/FLULAVAL QUAD) injection 0.5 mL  0.5 mL IntraMUSCular PRIOR TO DISCHARGE Shireen Kingsley MD        HYDROmorphone (PF) (DILAUDID) injection 3 mg  3 mg IntraVENous Q3H PRN Bobby Kelley MD   3 mg at 17 0820       OBJECTIVE:  Patient Vitals for the past 8 hrs:   BP Temp Pulse Resp SpO2 Weight   17 0603 - - - - - 139 lb 12.4 oz (63.4 kg)   17 0531 160/74 - 100 - 90 % -   17 0416 160/82 - 98 - 94 % -   17 0401 171/79 98.8 °F (37.1 °C) 95 - 96 % -   17 0346 160/77 - 100 - 93 % -   17 0331 156/80 - (!) 101 - 92 % -   17 0316 162/79 - - - - -   17 0315 - - (!) 101 - 91 % -   17 0301 160/76 - (!) 101 - 92 % -   17 0246 161/80 - (!) 101 - 91 % -   17 0231 160/77 - (!) 102 - 91 % -   17 0216 157/81 - (!) 103 - 91 % -   17 0201 152/78 - (!) 102 - 92 % -   17 0146 150/81 - (!) 103 - 94 % -   17 0131 167/77 - 99 - 97 % -   17 0116 157/81 - 100 - 92 % -   17 0101 155/85 - 100 - 93 % -   17 0046 156/78 - 98 - 93 % -   17 - - 99 22 92 % -   17 152/77 - - - - -     Temp (24hrs), Av.6 °F (37 °C), Min:98.3 °F (36.8 °C), Max:98.8 °F (37.1 °C)         Physical Exam:  Constitutional: Disshelved appearing male in no acute distress, sitting comfortably in the hospital bed. HEENT: Normocephalic and atraumatic. Oropharynx is clear, mucous membranes are moist.   Neck supple     Lymph node   No palpable submandibular, cervical, supraclavicular, axillary or inguinal lymph nodes. Skin Warm and dry. No bruising and no rash noted. No erythema. No pallor.     Respiratory Lungs are clear to auscultation bilaterally without wheezes, rales or rhonchi, normal air exchange without accessory muscle use. CVS Normal rate, regular rhythm and normal S1 and S2. No murmurs, gallops, or rubs. Abdomen Soft, nontender and nondistended, normoactive bowel sounds. No palpable mass. No hepatosplenomegaly. Neuro Grossly nonfocal with no obvious sensory or motor deficits. MSK Pain with ROM to left hip. No edema     Psych Appropriate mood and affect. Labs:    Recent Results (from the past 24 hour(s))   CBC WITH AUTOMATED DIFF    Collection Time: 02/18/17  3:35 PM   Result Value Ref Range    WBC 14.7 (H) 4.3 - 11.1 K/uL    RBC 3.74 (L) 4.23 - 5.67 M/uL    HGB 11.3 (L) 13.6 - 17.2 g/dL    HCT 35.2 (L) 41.1 - 50.3 %    MCV 94.1 79.6 - 97.8 FL    MCH 30.2 26.1 - 32.9 PG    MCHC 32.1 31.4 - 35.0 g/dL    RDW 15.6 (H) 11.9 - 14.6 %    PLATELET 951 909 - 472 K/uL    MPV 9.9 (L) 10.8 - 14.1 FL    NEUTROPHILS 82 (H) 43 - 78 %    LYMPHOCYTES 11 (L) 13 - 44 %    MONOCYTES 7 4.0 - 12.0 %    EOSINOPHILS 0 (L) 0.5 - 7.8 %    BASOPHILS 0 0.0 - 2.0 %    ABS. NEUTROPHILS 12.1 (H) 1.7 - 8.2 K/UL    ABS. LYMPHOCYTES 1.6 0.5 - 4.6 K/UL    ABS. MONOCYTES 1.0 0.1 - 1.3 K/UL    ABS. EOSINOPHILS 0.0 0.0 - 0.8 K/UL    ABS. BASOPHILS 0.0 0.0 - 0.2 K/UL    ABS. IMM.  GRANS. 0.1 0.0 - 0.5 K/UL    RBC COMMENTS NORMOCYTIC/NORMOCHROMIC      WBC COMMENTS Result Confirmed By Smear      PLATELET COMMENTS ADEQUATE      DF AUTOMATED     METABOLIC PANEL, COMPREHENSIVE    Collection Time: 02/18/17  3:35 PM   Result Value Ref Range    Sodium 142 136 - 145 mmol/L    Potassium 3.3 (L) 3.5 - 5.1 mmol/L    Chloride 106 98 - 107 mmol/L    CO2 24 21 - 32 mmol/L    Anion gap 12 7 - 16 mmol/L    Glucose 156 (H) 65 - 100 mg/dL    BUN 16 6 - 23 MG/DL    Creatinine 0.69 (L) 0.8 - 1.5 MG/DL    GFR est AA >60 >60 ml/min/1.73m2    GFR est non-AA >60 >60 ml/min/1.73m2    Calcium 9.0 8.3 - 10.4 MG/DL    Bilirubin, total 0.5 0.2 - 1.1 MG/DL    ALT (SGPT) 46 12 - 65 U/L AST (SGOT) 66 (H) 15 - 37 U/L    Alk. phosphatase 216 (H) 50 - 136 U/L    Protein, total 7.4 6.3 - 8.2 g/dL    Albumin 2.9 (L) 3.5 - 5.0 g/dL    Globulin 4.5 (H) 2.3 - 3.5 g/dL    A-G Ratio 0.6 (L) 1.2 - 3.5     MRSA SCREEN - PCR (NASAL)    Collection Time: 02/18/17  6:22 PM   Result Value Ref Range    Special Requests: NO SPECIAL REQUESTS      Culture result:        MRSA target DNA is not detected (presumptive not colonized with MRSA)   METABOLIC PANEL, BASIC    Collection Time: 02/19/17  4:00 AM   Result Value Ref Range    Sodium 139 136 - 145 mmol/L    Potassium 3.7 3.5 - 5.1 mmol/L    Chloride 104 98 - 107 mmol/L    CO2 25 21 - 32 mmol/L    Anion gap 10 7 - 16 mmol/L    Glucose 122 (H) 65 - 100 mg/dL    BUN 10 6 - 23 MG/DL    Creatinine 0.58 (L) 0.8 - 1.5 MG/DL    GFR est AA >60 >60 ml/min/1.73m2    GFR est non-AA >60 >60 ml/min/1.73m2    Calcium 8.7 8.3 - 10.4 MG/DL   CBC W/O DIFF    Collection Time: 02/19/17  4:00 AM   Result Value Ref Range    WBC 12.7 (H) 4.3 - 11.1 K/uL    RBC 3.64 (L) 4.23 - 5.67 M/uL    HGB 11.0 (L) 13.6 - 17.2 g/dL    HCT 34.7 (L) 41.1 - 50.3 %    MCV 95.3 79.6 - 97.8 FL    MCH 30.2 26.1 - 32.9 PG    MCHC 31.7 31.4 - 35.0 g/dL    RDW 16.0 (H) 11.9 - 14.6 %    PLATELET 136 911 - 987 K/uL    MPV 10.0 (L) 10.8 - 14.1 FL       Imaging:  CT CAP 2/18/17   CT Chest: There is no pleural or pericardial effusion. The heart and great  vessels are unremarkable. There are no pulmonary nodules or masses. No  adenopathy is present. There are no bony destructive changes.     CT ABDOMEN: There is an ill-defined region of decreased attenuation within the  left hepatic lobe measuring approximately 5.0 x 6.2 cm. There is additional  ill-defined regions of decreased attenuation within the left hepatic lobe. There  is thrombosis of the left portal vein with subtotal occlusion of the right  portal vein with peripheral right portal vein branches appearing patent.    .  The spleen, pancreas, adrenal glands and kidneys are unremarkable the exception  of a subcentimeter low-density lesion at the midpole right kidney, too small to  characterize. Moderate atherosclerotic changes are present involving the aorta. No bony destructive changes are present.     CT PELVIS: The destructive mass is again noted involving the left acetabulum  with a large soft tissue component extending into the pelvis, deforming the base  of the bladder. There is a trace amount of ascites. The urinary bladder is  moderately distended. No adenopathy is present. There are no additional bony  destructive lesions.     IMPRESSION:  1. Infiltrative appearing mass within the left hepatic lobe with resultant  portal vein occlusion, left greater than right. A primary liver neoplasm  including hepatocellular carcinoma or cholangiocarcinoma could account for this  appearance in addition to metastatic disease. 2. Bony destructive process involving the left acetabulum with extension into  the pelvis compatible with metastatic disease. CXR 2/18/17  Findings: A single view of the chest was obtained at 1531 hours.     The cardiac and mediastinal silhouette are normal in size and configuration. The  lungs and pleural spaces are clear. The pulmonary vascularity is within normal  limits. There is deformity and suspected lucency along the posterior margin of  the left sixth rib.      Impression: Deformity and suspected lucencies along the posterior margin left  sixth rib. Given the findings involving the left pelvis, the possibility of a  metastatic lesion would have to be considered.     CT left hip 2/18/17  FINDINGS: The entire acetabulum has a mottle moth-eaten appearance. There is  soft tissue swelling on the medial side of the acetabulum. Femoral head and neck  appear intact. There are displaced quite medially. There is sclerosis of the  initial tuberosity.     IMPRESSION: Moth-eaten lesion in the acetabulum with protrusio.  Malignancy,  possibly metastatic disease suspected. Infection would be considered unlikely as  the femoral head remains intact. Left hip xray 2/18/17  FINDINGS: Left femoral head is dislocated or significantly impacted. . Patient is  rotated. Unclear if this is anterior or posterior. No definite fracture. SI  joints symmetric. Pubic rami probably intact.     IMPRESSION: Left femoral head is dislocated or severely impacted. CT scan may  be helpful. 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 Found to have what appears to be metastatic to the left hip. CT CAP showed 5x6.2cm liver lesion, concerning for cholangiocarcinoma or hepatocellular carcinoma    RECOMMENDATIONS:  Concern for metastatic carcinoma  - Check tumor markers on AM labs  - Will need biopsy of liver to confirm pathology. Consult IR  - Hepatitis/HIV Panel    Intractable pain left hip  - Case reviewed by Dr. Issa Awad who did not recommend intervention at this time  - Started on PCA by primary team. May need to titrate up if pain uncontrolled    Portal vein thrombosis  - Increase lovenox to treatment dose    HTN  - On cardene drip per primary    Lab studies and imaging studies (CT scans) were personally reviewed. Thank you for allowing us to participate in the care of Mr. Gillermina Barthel.   We will continue to follow along            Jarvis Rivera NP   [facility]  [address] [address]  Office : [phone]  Fax : [phone]       Attending Addendum:  I personally evaluated the patient with Lora Smith N.P.,  and agree with the assessment, findings and plan as documented. Appears stable, we will ask IR to perform a liver biopsy.               Jacki Loredo MD  [address] [place] [address]  Office : [phone]  Fax : [phone]

## 2017-02-19 NOTE — PROGRESS NOTES
TRANSFER - IN REPORT:    Verbal report received from Ruben Palomares RN on Patria Murillo  being received from ICU for routine progression of care      Report consisted of patients Situation, Background, Assessment and   Recommendations(SBAR). Information from the following report(s) SBAR was reviewed with the receiving nurse. Opportunity for questions and clarification was provided. Assessment completed upon patients arrival to unit and care assumed.

## 2017-02-19 NOTE — PROGRESS NOTES
Dual skin assessment performed by this RN and Nieves Turner RN. Skin is dry and intact with no pressure ulcers. Allevyn to sacrum for prophylaxis.

## 2017-02-19 NOTE — PROGRESS NOTES
Pt pain is almost always out of control while awake. He is able to sleep approx 2.5 hrs then awakens with BP going up and trembling from the pain. Dilaudid 3 mg gives him quick pain relief but does not appear to last very long.

## 2017-02-19 NOTE — PROGRESS NOTES
TRANSFER - IN REPORT:    Verbal report received from Keshia Esquivel RN(name) on Shannon Ervin  being received from ED(unit) for routine progression of care      Report consisted of patients Situation, Background, Assessment and   Recommendations(SBAR). Information from the following report(s) SBAR, Kardex, ED Summary, Intake/Output, MAR, Recent Results and Med Rec Status was reviewed with the receiving nurse. Opportunity for questions and clarification was provided. Assessment completed upon patients arrival to unit and care assumed. Skin assessment done no breakdown noted. Has multiple tattoos on chest and back. Allevyn placed on sacral area.

## 2017-02-19 NOTE — PROGRESS NOTES
Problem: Nutrition Deficit  Goal: *Optimize nutritional status  Nutrition  Reason for assessment: Referral received from nursing admission Malnutrition Screening Tool for recently lost >33# without trying and eating poorly due to decreased appetite. Assessment:   Diet order(s): cardiac  Food/Nutrition Patient History:  Patient admitted s/p fall with hepatic mass and potential mets to hip/ribs. Plan for biopsy of liver in IR. Patient reports to RD that he has lost ~30 pounds over the past couple of months. Patient told me about how his wife  four months ago, how he works hard \"on his legs\" during the day, and does not have much of an appetite. He reports trying to eat breakfast (consisting of a biscuit) everyday but does not have much time to eat a lunch. He gets home late and doesn't feel like eating a full meal.  Patient was unable to eat breakfast or lunch today due to severe pain. RD encouraged patient choose off of the always available menu and encouraged sipping on ensure to provide protein. Patient is receptive. Anthropometrics:Height: 5' 10\" (177.8 cm),  Weight: 63.4 kg (139 lb 12.4 oz), Weight Source: Bed, Body mass index is 20.06 kg/(m^2). BMI class of normal weight. No previous recorded weights to assess reported weight loss. Macronutrient needs:  EER:  3417-4709 kcal /day (25-30 kcal/kg listed BW)  EPR:   grams protein/day (1.2-1.4 grams/kg IBW)  Intake/Comparative Standards:  Average intake for past 1 day(s)/1 recorded meal(s): 0%. This potentially meets ~0% of kcal and ~0% of protein needs     Nutrition Diagnosis: Inadequate oral intake r/t decreased ability to consume sufficient oral intake as evidenced by patient reported weight loss of > 30 pounds, decreased appetite, eating 0% of breakfast and lunch today. Intervention:  Meals and snacks: Continue current diet. Nutrition Supplement Therapy: ensure enlive TID with milk   Patient would benefit from an appetite stimulant. RD provided patient with an ensure enlive and skim milk. RD provided patient with a menu.      Trang Steward [address], [address] [place] [address], [address] [place] [address], [phone]

## 2017-02-19 NOTE — PROGRESS NOTES
Pt placed on O2 at 2l. Pt is resting from the dilaudid given previously. He refuses to be pulled up or turn stating he would rather do it himself due to the pain. He states it hurts to bad for us to do it.

## 2017-02-19 NOTE — PROGRESS NOTES
Bedside and Verbal shift change report given to Vanda (oncoming nurse) by Kerry Cabrera RN (offgoing nurse). Report included the following information SBAR, Kardex, ED Summary, Intake/Output, MAR, Recent Results and Med Rec Status.

## 2017-02-20 ENCOUNTER — APPOINTMENT (OUTPATIENT)
Dept: ULTRASOUND IMAGING | Age: 52
DRG: 542 | End: 2017-02-20
Attending: NURSE PRACTITIONER
Payer: SELF-PAY

## 2017-02-20 LAB
AFP-TM SERPL-MCNC: ABNORMAL NG/ML
CANCER AG19-9 SERPL-ACNC: 6.9 U/ML (ref 2–37)
CEA SERPL-MCNC: 2.2 NG/ML (ref 0–3)
HCT VFR BLD AUTO: 30.4 % (ref 41.1–50.3)
HCT VFR BLD AUTO: 31.5 % (ref 41.1–50.3)
HGB BLD-MCNC: 9.5 G/DL (ref 13.6–17.2)
HGB BLD-MCNC: 9.8 G/DL (ref 13.6–17.2)
INR PPP: 1 (ref 0.9–1.2)
PROTHROMBIN TIME: 11.4 SEC (ref 9.6–12)

## 2017-02-20 PROCEDURE — 77030014007 US GUIDE BX LIV PERC

## 2017-02-20 PROCEDURE — 88307 TISSUE EXAM BY PATHOLOGIST: CPT | Performed by: INTERNAL MEDICINE

## 2017-02-20 PROCEDURE — 74011250637 HC RX REV CODE- 250/637: Performed by: NURSE PRACTITIONER

## 2017-02-20 PROCEDURE — 76450000000

## 2017-02-20 PROCEDURE — 74011000250 HC RX REV CODE- 250: Performed by: RADIOLOGY

## 2017-02-20 PROCEDURE — 87389 HIV-1 AG W/HIV-1&-2 AB AG IA: CPT | Performed by: NURSE PRACTITIONER

## 2017-02-20 PROCEDURE — 36415 COLL VENOUS BLD VENIPUNCTURE: CPT | Performed by: NURSE PRACTITIONER

## 2017-02-20 PROCEDURE — 74011250637 HC RX REV CODE- 250/637: Performed by: INTERNAL MEDICINE

## 2017-02-20 PROCEDURE — 86301 IMMUNOASSAY TUMOR CA 19-9: CPT | Performed by: NURSE PRACTITIONER

## 2017-02-20 PROCEDURE — 74011250636 HC RX REV CODE- 250/636

## 2017-02-20 PROCEDURE — 85610 PROTHROMBIN TIME: CPT | Performed by: RADIOLOGY

## 2017-02-20 PROCEDURE — 85018 HEMOGLOBIN: CPT | Performed by: INTERNAL MEDICINE

## 2017-02-20 PROCEDURE — 0FB23ZX EXCISION OF LEFT LOBE LIVER, PERCUTANEOUS APPROACH, DIAGNOSTIC: ICD-10-PCS | Performed by: RADIOLOGY

## 2017-02-20 PROCEDURE — 80074 ACUTE HEPATITIS PANEL: CPT | Performed by: NURSE PRACTITIONER

## 2017-02-20 PROCEDURE — 74011250636 HC RX REV CODE- 250/636: Performed by: RADIOLOGY

## 2017-02-20 PROCEDURE — 82378 CARCINOEMBRYONIC ANTIGEN: CPT | Performed by: NURSE PRACTITIONER

## 2017-02-20 PROCEDURE — 74011250636 HC RX REV CODE- 250/636: Performed by: NURSE PRACTITIONER

## 2017-02-20 PROCEDURE — 99153 MOD SED SAME PHYS/QHP EA: CPT

## 2017-02-20 PROCEDURE — 65270000029 HC RM PRIVATE

## 2017-02-20 PROCEDURE — 99152 MOD SED SAME PHYS/QHP 5/>YRS: CPT

## 2017-02-20 PROCEDURE — 82105 ALPHA-FETOPROTEIN SERUM: CPT | Performed by: NURSE PRACTITIONER

## 2017-02-20 RX ORDER — FENTANYL CITRATE 50 UG/ML
25-100 INJECTION, SOLUTION INTRAMUSCULAR; INTRAVENOUS
Status: DISCONTINUED | OUTPATIENT
Start: 2017-02-20 | End: 2017-02-20

## 2017-02-20 RX ORDER — NALOXONE HYDROCHLORIDE 0.4 MG/ML
0.04 INJECTION, SOLUTION INTRAMUSCULAR; INTRAVENOUS; SUBCUTANEOUS AS NEEDED
Status: DISCONTINUED | OUTPATIENT
Start: 2017-02-20 | End: 2017-02-21 | Stop reason: HOSPADM

## 2017-02-20 RX ORDER — SODIUM CHLORIDE 9 MG/ML
150 INJECTION, SOLUTION INTRAVENOUS CONTINUOUS
Status: CANCELLED | OUTPATIENT
Start: 2017-02-20 | End: 2017-02-20

## 2017-02-20 RX ORDER — NAPROXEN 250 MG/1
375 TABLET ORAL
Status: DISCONTINUED | OUTPATIENT
Start: 2017-02-20 | End: 2017-02-21 | Stop reason: HOSPADM

## 2017-02-20 RX ORDER — SODIUM CHLORIDE 9 MG/ML
25 INJECTION, SOLUTION INTRAVENOUS ONCE
Status: COMPLETED | OUTPATIENT
Start: 2017-02-20 | End: 2017-02-20

## 2017-02-20 RX ORDER — LIDOCAINE HYDROCHLORIDE 20 MG/ML
50-200 INJECTION, SOLUTION INFILTRATION; PERINEURAL ONCE
Status: COMPLETED | OUTPATIENT
Start: 2017-02-20 | End: 2017-02-20

## 2017-02-20 RX ORDER — ENOXAPARIN SODIUM 100 MG/ML
60 INJECTION SUBCUTANEOUS EVERY 12 HOURS
Status: CANCELLED | OUTPATIENT
Start: 2017-02-20

## 2017-02-20 RX ORDER — IBUPROFEN 400 MG/1
400 TABLET ORAL
Status: DISCONTINUED | OUTPATIENT
Start: 2017-02-20 | End: 2017-02-21 | Stop reason: HOSPADM

## 2017-02-20 RX ORDER — MORPHINE SULFATE 15 MG/1
15 TABLET ORAL
Status: DISCONTINUED | OUTPATIENT
Start: 2017-02-20 | End: 2017-02-21

## 2017-02-20 RX ORDER — AMOXICILLIN 250 MG
2 CAPSULE ORAL DAILY
Status: DISCONTINUED | OUTPATIENT
Start: 2017-02-21 | End: 2017-02-21 | Stop reason: HOSPADM

## 2017-02-20 RX ORDER — MIDAZOLAM HYDROCHLORIDE 1 MG/ML
.5-2 INJECTION, SOLUTION INTRAMUSCULAR; INTRAVENOUS
Status: DISCONTINUED | OUTPATIENT
Start: 2017-02-20 | End: 2017-02-20

## 2017-02-20 RX ORDER — DIPHENHYDRAMINE HYDROCHLORIDE 50 MG/ML
12.5-5 INJECTION, SOLUTION INTRAMUSCULAR; INTRAVENOUS ONCE
Status: COMPLETED | OUTPATIENT
Start: 2017-02-20 | End: 2017-02-20

## 2017-02-20 RX ORDER — MORPHINE SULFATE 10 MG/ML
6 INJECTION, SOLUTION INTRAMUSCULAR; INTRAVENOUS
Status: DISCONTINUED | OUTPATIENT
Start: 2017-02-20 | End: 2017-02-21 | Stop reason: HOSPADM

## 2017-02-20 RX ADMIN — LABETALOL HCL 100 MG: 100 TABLET, FILM COATED ORAL at 12:19

## 2017-02-20 RX ADMIN — MIDAZOLAM HYDROCHLORIDE 1 MG: 1 INJECTION, SOLUTION INTRAMUSCULAR; INTRAVENOUS at 08:40

## 2017-02-20 RX ADMIN — MORPHINE SULFATE 15 MG: 15 TABLET ORAL at 21:48

## 2017-02-20 RX ADMIN — Medication 10 ML: at 14:00

## 2017-02-20 RX ADMIN — OXYCODONE HYDROCHLORIDE AND ACETAMINOPHEN 1 TABLET: 10; 325 TABLET ORAL at 07:53

## 2017-02-20 RX ADMIN — MORPHINE SULFATE 6 MG: 10 INJECTION INTRAMUSCULAR; INTRAVENOUS; SUBCUTANEOUS at 15:14

## 2017-02-20 RX ADMIN — MORPHINE SULFATE 15 MG: 15 TABLET ORAL at 13:16

## 2017-02-20 RX ADMIN — LABETALOL HCL 100 MG: 100 TABLET, FILM COATED ORAL at 21:48

## 2017-02-20 RX ADMIN — DIPHENHYDRAMINE HYDROCHLORIDE 50 MG: 50 INJECTION, SOLUTION INTRAMUSCULAR; INTRAVENOUS at 08:44

## 2017-02-20 RX ADMIN — OXYCODONE HYDROCHLORIDE AND ACETAMINOPHEN 1 TABLET: 10; 325 TABLET ORAL at 11:24

## 2017-02-20 RX ADMIN — IBUPROFEN 400 MG: 400 TABLET, FILM COATED ORAL at 23:40

## 2017-02-20 RX ADMIN — FENTANYL CITRATE 100 MCG: 50 INJECTION, SOLUTION INTRAMUSCULAR; INTRAVENOUS at 08:52

## 2017-02-20 RX ADMIN — MORPHINE SULFATE 6 MG: 10 INJECTION INTRAMUSCULAR; INTRAVENOUS; SUBCUTANEOUS at 19:17

## 2017-02-20 RX ADMIN — MIDAZOLAM HYDROCHLORIDE 1 MG: 1 INJECTION, SOLUTION INTRAMUSCULAR; INTRAVENOUS at 08:46

## 2017-02-20 RX ADMIN — Medication 10 ML: at 03:41

## 2017-02-20 RX ADMIN — NAPROXEN 375 MG: 250 TABLET ORAL at 16:23

## 2017-02-20 RX ADMIN — FENTANYL CITRATE 50 MCG: 50 INJECTION, SOLUTION INTRAMUSCULAR; INTRAVENOUS at 08:47

## 2017-02-20 RX ADMIN — MORPHINE SULFATE 6 MG: 10 INJECTION INTRAMUSCULAR; INTRAVENOUS; SUBCUTANEOUS at 22:20

## 2017-02-20 RX ADMIN — SODIUM BICARBONATE 2 ML: 0.2 INJECTION, SOLUTION INTRAVENOUS at 08:50

## 2017-02-20 RX ADMIN — LIDOCAINE HYDROCHLORIDE 50 MG: 20 INJECTION, SOLUTION INFILTRATION; PERINEURAL at 08:53

## 2017-02-20 RX ADMIN — MORPHINE SULFATE 15 MG: 15 TABLET ORAL at 17:15

## 2017-02-20 RX ADMIN — AMLODIPINE BESYLATE 5 MG: 5 TABLET ORAL at 12:19

## 2017-02-20 RX ADMIN — Medication 10 ML: at 07:02

## 2017-02-20 RX ADMIN — Medication 10 ML: at 07:03

## 2017-02-20 RX ADMIN — FENTANYL CITRATE 50 MCG: 50 INJECTION, SOLUTION INTRAMUSCULAR; INTRAVENOUS at 08:40

## 2017-02-20 RX ADMIN — SODIUM CHLORIDE 25 ML/HR: 900 INJECTION, SOLUTION INTRAVENOUS at 08:55

## 2017-02-20 NOTE — PROGRESS NOTES
Hospitalist Progress Note    2017  Admit Date: 2017 11:38 AM   NAME: Oscar Noel   :  1965   MRN:  [id number]   Attending: Ramos Kaba MD  PCP:  None      Admitted for: Hypertensive emergency - fractured left hip with concern for cancer    SUBJECTIVE:   As Previously documented: \" Oscar Noel is a 50yoM with HTN (ran out of his unknown anti-HTN about 4 mths ago) who presented to [facility] ED after slipping at work and falling \"in the splits\". He came in with significant L hip pain and inability to bear weight. He has been having significant pain in the L hip for about 5mths and has been seeing a chiropractor about it for 2-3 weeks. He also has had associated numbness in his L foot for about a month and unsteadiness on his L leg for a few months. In the ED, he was found to have SBP in the 230s. Hip CT showed \"moth eaten lesion in the acetabulum\" concerning for metastatic disease. He endorses 30lb weight loss and night sweats for the last 6 mths. Denies SOB, CP, abd pain, nausea. \"       16    Abdominal images showed a liver lesion. IR performed a liver biopsy today. Will monitor his VS and hgb level. Has been on a dilaudid PCA pump for pain in the left hip. Transitioned to oral morphine today. Pain and palliative on board.      Review of Systems negative with exception of pertinent positives noted above  Past medical history unchanged from H&P    PHYSICAL EXAM     Visit Vitals    /70    Pulse 96    Temp 98.5 °F (36.9 °C)    Resp 13    Ht 5' 10\" (1.778 m)    Wt 63.4 kg (139 lb 12.4 oz)    SpO2 96%    BMI 20.06 kg/m2      Temp (24hrs), Av.7 °F (37.1 °C), Min:97.9 °F (36.6 °C), Max:100.3 °F (37.9 °C)    Oxygen Therapy  O2 Sat (%): 96 % (17 0835)  Pulse via Oximetry: 81 beats per minute (17 1159)  O2 Device: CO2 nasal cannula (17 0832)  O2 Flow Rate (L/min): 2 l/min (17 0840)  FIO2 (%): 21 % (17 0531)    Intake/Output Summary (Last 24 hours) at 02/20/17 0845  Last data filed at 02/20/17 0750   Gross per 24 hour   Intake              360 ml   Output              950 ml   Net             -590 ml        General: Thin, emaciated  mucous membranes pink and moist acyanotic anicteric  Neck:  Supple full range of motion  Lungs:  Air entry equal bilaterally, no crepitations rales rhonchi   Heart:  Regular rate and rhythm,  No murmur, rub, or gallop  Abdomen: Soft, Non distended, Non tender, no rebound guarding Positive bowel sounds  Extremities: No cyanosis, clubbing or edema  Neurologic:  No focal deficits  Musculoskeletal: no   Joint swelling tenderness erythema  Skin:  No erythema, rashes noted          LAB  No results for input(s): GLUCPOC in the last 72 hours. No lab exists for component: GLPOC   Recent Labs      02/19/17   0400   WBC  12.7*   HGB  11.0*   HCT  34.7*   PLT  226     Recent Labs      02/19/17   0400  02/18/17   1535   NA  139  142   K  3.7  3.3*   CL  104  106   CO2  25  24   GLU  122*  156*   BUN  10  16   CREA  0.58*  0.69*   CA  8.7  9.0   ALB   --   2.9*   TBILI   --   0.5   ALT   --   46   SGOT   --   66*       EKG and imaging reviewed personally by me  No results found. No results found for this or any previous visit. XR Results (most recent):    Results from Hospital Encounter encounter on 02/18/17   XR CHEST PORT   Narrative Portable chest:     History: acetabular fracture with possible metastatic lesion. Comparison: None    Findings: A single view of the chest was obtained at 1531 hours. The cardiac and mediastinal silhouette are normal in size and configuration. The  lungs and pleural spaces are clear. The pulmonary vascularity is within normal  limits. There is deformity and suspected lucency along the posterior margin of  the left sixth rib. Impression Impression:   Deformity and suspected lucencies along the posterior margin left  sixth rib.  Given the findings involving the left pelvis, the possibility of a  metastatic lesion would have to be considered. 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 Controlled on oral labetalol      - Pathologic hip fx: Liver mass is probably primary lesion. Oncology on board. AFP is very high. Transitioned from a dilaudid PCA pump to oral and IV morphine. Pain and palliative on board     -Hepatic mass- likely hepato- cellular cancer- Biopsy done today.  Once pain is controlled , he will need to be discharged and follow up with oncology.                Code Status: FULL  DVT Prophylaxis: Lovenox           Signed By: Lee Ann Breaux MD     February 20, 2017

## 2017-02-20 NOTE — CONSULTS
Palliative Care    Patient: Saad Pichardo MRN: [id number]  SSN: [id number]    YOB: 1965  Age: 47 y.o. Sex: male       Date of Request: 2/20/2017  Date of Consult:  2/20/2017  Reason for Consult:  pain and symptom management  Requesting Physician: Lloyd Harris NP     Assessment/Plan:     Principal Diagnosis:    Pain, bone  M89.9    Additional Diagnoses:   · Debility, Unspecified  R53.81  · Fatigue, Lethargy  R53.83  · Pain, limb  M79.609  · Counseling, Encounter for Medical Advice  Z71.9  · Encounter for Palliative Care  Z51.5    Palliative Performance Scale (PPS):  PPS: 50    Medical Decision Making:   Reviewed and summarized notes from admission to present   Discussed case with appropriate providers- Dr Laura Marie; Marimar, Primary RN  Reviewed laboratory and x-ray data from admission to present     Pt resting in bed, no distress noted. Mother in law at bedside. Introduced role of PC and reviewed events of hospitalization. Pt reports uncontrolled left hip/leg pain, even with Dilaudid PCA. He states he gets no pain relief with the PCA or oral Percocet. He reports he had Morphine in the past when he broke his leg, and it was helpful. Counseled on the goal of having pain managed with oral opioids- he voiced understanding. Will discontinue Dilaudid PCA. Start Morphine IR 15 mg q 3 hours PRN, and Morphine 6 mg IV q 3 hours PRN for pain unrelieved with MSIR. Will also start trial of Naprosyn 375 mg TID with meals. Narcan available PRN. Will also start daily bowel regimen of Pericolace 2 tabs daily. Pt's goal is to return to [place], and pursue care there. Discussed with Dionna Sung RN. Will continue to follow. Will discuss findings with members of the interdisciplinary team.      Thank you for this referral.   The Palliative Care team is available from 8 am to 4:30 pm Monday-Friday. Medical management during other hours is per the primary attending service.        .    Subjective:     History obtained from:  Patient, Family, Care Provider and Chart    Chief Complaint: Left hip pain  History of Present Illness:  Mr Donn Wyman is a 46 yo  male with PMH of HTN, who presented to the ER on 2/18/2017 with c/o severe left hip pain after falling at work. He was unable to bear weight on the leg. He reported left hip/leg pain for the past 5 months, and had been seen by the chiropractor for the last few weeks. He also reported some numbness in the left foot. Work up revealed a moth eaten lesion in the left acetabulum, concerning for metastatic disease. Pt was admitted for further management. CT scan of the CAP revealed a mass within the left hepatic dome with portal vein occlusion. Pt has been evaluated by oncology, and underwent liver biopsy this morning with IR. He has uncontrolled left hip pain since admission, despite Dilaudid PCA. Advance Directive: No       Code Status:  Full Code            Health Care Power of : No - Patient does not have a [address]. Past Medical History   Diagnosis Date    HTN (hypertension)       History reviewed. No pertinent past surgical history. Family History   Problem Relation Age of Onset    Cancer Mother       Social History   Substance Use Topics    Smoking status: Current Every Day Smoker     Packs/day: 0.75     Years: 35.00    Smokeless tobacco: Not on file    Alcohol use Yes      Comment: 2-3 drinks a week     Prior to Admission medications    Medication Sig Start Date End Date Taking? Authorizing Provider   HYDROcodone-acetaminophen (NORCO) 7.5-325 mg per tablet Take 2 Tabs by mouth every six (6) hours as needed for Pain. Yes Historical Provider       No Known Allergies     Review of Systems:  A comprehensive review of systems was negative except for:   Constitutional: Positive for fatigue.   Musculoskeletal: Positive for left hip/leg pain      Objective:     Visit Vitals    /62 (BP 1 Location: Right arm, BP Patient Position: At rest)    Pulse 94    Temp 98.3 °F (36.8 °C)    Resp 22    Ht 5' 10\" (1.778 m)    Wt 63.4 kg (139 lb 12.4 oz)    SpO2 94%    BMI 20.06 kg/m2        Physical Exam:    General:  Cooperative. Debilitated. No acute distress. Eyes:  Conjunctivae/corneas clear    Nose: Nares normal. Septum midline.    Neck: Supple, symmetrical, trachea midline   Lungs:   Clear to auscultation bilaterally, unlabored   Heart:  Regular rate and rhythm    Abdomen:   Soft, non-tender, non-distended   Extremities: Normal, atraumatic, no cyanosis or edema   Skin: Skin color, texture, turgor normal.   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

## 2017-02-20 NOTE — PROGRESS NOTES
During procedure, the left AC IV looked red and infiltrated. IV site still perfusing. Stopped using IV site during procedure. Used alternate site.

## 2017-02-20 NOTE — PROGRESS NOTES
Liver mass w Portal Vein extension. Erosive left acetabulum lesion. Expansile rib lesion. Weight loss. Abnormal LFTs. Alpha-feto Protein level is pending. History of heavy alcohol use. Suspect Hepatocellular Carcinoma w bony metastases. Liver biopsy should provide the best sample for evaluation.       Michelle Ho MD

## 2017-02-20 NOTE — PROGRESS NOTES
TRANSFER - IN REPORT:    Verbal report received from [facility] RN(name) on Demond Cortes  being received from IR (unit) for routine progression of care      Report consisted of patients Situation, Background, Assessment and   Recommendations(SBAR). Information from the following report(s) Procedure Summary was reviewed with the receiving nurse. Opportunity for questions and clarification was provided. Assessment completed upon patients arrival to unit and care assumed.

## 2017-02-20 NOTE — PROCEDURES
Interventional Radiology Brief Procedure Note    Patient: Aisha Charles MRN: [id number]  SSN: [id number]    YOB: 1965  Age: 47 y.o. Sex: male      Date of Procedure: 2/20/2017    Pre-Procedure Diagnosis: Left lobe liver mass. Bone mets. Post-Procedure Diagnosis: SAME    Procedure(s): Image Guided Biopsy-left lobe liver. Brief Description of Procedure: as above    Performed By: Martha Meade MD     Assistants: None    Anesthesia: Moderate Sedation    Estimated Blood Loss: Less than 10ml    Specimens: Formalin to Path    Implants: None    Findings: Infiltrating mass in the left lobe. Complications: None    Recommendations: 3 hour bedrest.  Resume lovenox tonight. Follow Up: Dr Roslyn Gomez.      Signed By: Martha Meade MD     February 20, 2017

## 2017-02-20 NOTE — PROGRESS NOTES
TRANSFER - OUT REPORT:    Verbal report given to Cathryn Sanches RN(name) on Oscar Noel  being transferred to IR recovery(unit) for Routine progression of care    Report consisted of patients Situation, Background, Assessment and   Recommendations(SBAR). Information from the following report(s) SBAR was reviewed with the receiving nurse. Lines:   Peripheral IV 02/18/17 Left Antecubital (Active)   Site Assessment Clean, dry, & intact 2/20/2017  7:04 AM   Phlebitis Assessment 0 2/20/2017  7:04 AM   Infiltration Assessment 0 2/20/2017  7:04 AM   Dressing Status Clean, dry, & intact 2/20/2017  7:04 AM   Dressing Type Tape;Transparent 2/20/2017  7:04 AM   Hub Color/Line Status Flushed;Patent 2/20/2017  7:04 AM   Alcohol Cap Used No 2/20/2017  7:04 AM       Peripheral IV 02/18/17 (Active)   Site Assessment Clean, dry, & intact 2/20/2017  7:04 AM   Phlebitis Assessment 0 2/20/2017  7:04 AM   Infiltration Assessment 0 2/20/2017  7:04 AM   Dressing Status Clean, dry, & intact 2/20/2017  7:04 AM   Dressing Type Tape;Transparent 2/20/2017  7:04 AM   Hub Color/Line Status Flushed;Patent 2/20/2017  7:04 AM   Alcohol Cap Used No 2/20/2017  7:04 AM        Opportunity for questions and clarification was provided.       Patient transported with:   Monitor

## 2017-02-20 NOTE — PROGRESS NOTES
TRANSFER - OUT REPORT:    Verbal report given to YAEL Minaya(name) on Re[facility]  being transferred to 5th Floor(unit) for routine progression of care       Report consisted of patients Situation, Background, Assessment and   Recommendations(SBAR). Information from the following report(s) SBAR was reviewed with the receiving nurse. Lines:   Peripheral IV 02/18/17 Left Antecubital (Active)   Site Assessment Clean, dry, & intact 2/20/2017  7:04 AM   Phlebitis Assessment 0 2/20/2017  7:04 AM   Infiltration Assessment 0 2/20/2017  7:04 AM   Dressing Status Clean, dry, & intact 2/20/2017  7:04 AM   Dressing Type Tape;Transparent 2/20/2017  7:04 AM   Hub Color/Line Status Flushed;Patent 2/20/2017  7:04 AM   Alcohol Cap Used No 2/20/2017  7:04 AM       Peripheral IV 02/18/17 (Active)   Site Assessment Clean, dry, & intact 2/20/2017  7:04 AM   Phlebitis Assessment 0 2/20/2017  7:04 AM   Infiltration Assessment 0 2/20/2017  7:04 AM   Dressing Status Clean, dry, & intact 2/20/2017  7:04 AM   Dressing Type Tape;Transparent 2/20/2017  7:04 AM   Hub Color/Line Status Flushed;Patent 2/20/2017  7:04 AM   Alcohol Cap Used No 2/20/2017  7:04 AM        Opportunity for questions and clarification was provided.       Patient transported with:hospital transport

## 2017-02-20 NOTE — PROGRESS NOTES
Inpatient Hematology / Oncology Progress Note      Admission Date: 2017 11:38 AM  Reason for Admission/Hospital Course: Accelerated hypertension    24 Hour Events:  Afebrile, vitals stable  S/p liver biopsy today  Pain generally uncontrolled      ROS:  Constitutional: Negative for fever, chills, weakness, malaise, fatigue. CV: Negative for chest pain, palpitations, edema. Respiratory: Negative for dyspnea, cough, wheezing. GI: Negative for nausea, abdominal pain, diarrhea. 10 point review of systems is otherwise negative with the exception of the elements mentioned above in the HPI. No Known Allergies    OBJECTIVE:  Patient Vitals for the past 8 hrs:   BP Temp Pulse Resp SpO2   17 1204 127/62 98.3 °F (36.8 °C) 94 22 94 %   17 0938 137/60 - - - 93 %   17 0934 - - - - 94 %   17 0933 122/61 - - - -   17 0928 126/63 - - - 95 %   17 0918 122/63 - - - 94 %   17 0913 120/59 - - - 94 %   17 0908 129/59 - 93 18 94 %   17 0900 127/62 - 92 16 92 %   17 0855 129/64 - 95 22 93 %   17 0850 129/63 - 95 14 95 %   17 0845 136/68 - 96 14 96 %   17 0840 136/70 - 96 13 96 %   17 0835 137/64 - 96 17 96 %   17 0832 130/61 - 95 20 96 %   17 0811 143/65 - (!) 103 18 92 %   17 0753 - 98.5 °F (36.9 °C) - - -   17 0751 139/66 98.5 °F (36.9 °C) (!) 101 20 93 %     Temp (24hrs), Av.7 °F (37.1 °C), Min:97.9 °F (36.6 °C), Max:100.3 °F (37.9 °C)     07 -  1900  In: -   Out: 200 [Urine:200]    Physical Exam:  Constitutional: Disshelved appearing male in no acute distress, sitting comfortably in the hospital bed. HEENT: Normocephalic and atraumatic. Oropharynx is clear, mucous membranes are moist.  Neck supple     Lymph node   Deferred   Skin Warm and dry. No bruising and no rash noted. No erythema. No pallor.     Respiratory Lungs are clear to auscultation bilaterally without wheezes, rales or rhonchi, normal air exchange without accessory muscle use. CVS Normal rate, regular rhythm and normal S1 and S2. No murmurs, gallops, or rubs. Abdomen Soft, nontender and nondistended, normoactive bowel sounds. No palpable mass. No hepatosplenomegaly. Neuro Grossly nonfocal with no obvious sensory or motor deficits. MSK Normal range of motion in general.  No edema and no tenderness. Psych Appropriate mood and affect. Labs:    Recent Labs      02/19/17   0400  02/18/17   1535   WBC  12.7*  14.7*   RBC  3.64*  3.74*   HGB  11.0*  11.3*   HCT  34.7*  35.2*   MCV  95.3  94.1   MCH  30.2  30.2   MCHC  31.7  32.1   RDW  16.0*  15.6*   PLT  226  239   GRANS   --   82*   LYMPH   --   11*   MONOS   --   7   EOS   --   0*   BASOS   --   0   DF   --   AUTOMATED   ANEU   --   12.1*   ABL   --   1.6   ABM   --   1.0   JONATAN   --   0.0   ABB   --   0.0   AIG   --   0.1      Recent Labs      02/19/17   0400  02/18/17   1535   NA  139  142   K  3.7  3.3*   CL  104  106   CO2  25  24   AGAP  10  12   GLU  122*  156*   BUN  10  16   CREA  0.58*  0.69*   GFRAA  >60  >60   GFRNA  >60  >60   CA  8.7  9.0   SGOT   --   66*   AP   --   216*   TP   --   7.4   ALB   --   2.9*   GLOB   --   4.5*   AGRAT   --   0.6*     Imaging:  CT CAP 2/18/17   CT Chest: There is no pleural or pericardial effusion. The heart and great  vessels are unremarkable. There are no pulmonary nodules or masses. No  adenopathy is present. There are no bony destructive changes.      CT ABDOMEN: There is an ill-defined region of decreased attenuation within the  left hepatic lobe measuring approximately 5.0 x 6.2 cm. There is additional  ill-defined regions of decreased attenuation within the left hepatic lobe. There  is thrombosis of the left portal vein with subtotal occlusion of the right  portal vein with peripheral right portal vein branches appearing patent.    .  The spleen, pancreas, adrenal glands and kidneys are unremarkable the exception  of a subcentimeter low-density lesion at the midpole right kidney, too small to  characterize. Moderate atherosclerotic changes are present involving the aorta. No bony destructive changes are present.      CT PELVIS: The destructive mass is again noted involving the left acetabulum  with a large soft tissue component extending into the pelvis, deforming the base  of the bladder. There is a trace amount of ascites. The urinary bladder is  moderately distended. No adenopathy is present. There are no additional bony  destructive lesions.      IMPRESSION:  1. Infiltrative appearing mass within the left hepatic lobe with resultant  portal vein occlusion, left greater than right. A primary liver neoplasm  including hepatocellular carcinoma or cholangiocarcinoma could account for this  appearance in addition to metastatic disease. 2. Bony destructive process involving the left acetabulum with extension into  the pelvis compatible with metastatic disease.     CXR 2/18/17  Findings: A single view of the chest was obtained at 1531 hours.      The cardiac and mediastinal silhouette are normal in size and configuration. The  lungs and pleural spaces are clear. The pulmonary vascularity is within normal  limits. There is deformity and suspected lucency along the posterior margin of  the left sixth rib.       Impression: Deformity and suspected lucencies along the posterior margin left  sixth rib. Given the findings involving the left pelvis, the possibility of a  metastatic lesion would have to be considered.      CT left hip 2/18/17  FINDINGS: The entire acetabulum has a mottle moth-eaten appearance. There is  soft tissue swelling on the medial side of the acetabulum. Femoral head and neck  appear intact. There are displaced quite medially. There is sclerosis of the  initial tuberosity.      IMPRESSION: Moth-eaten lesion in the acetabulum with protrusio. Malignancy,  possibly metastatic disease suspected. Infection would be considered unlikely as  the femoral head remains intact.     Left hip xray 2/18/17  FINDINGS: Left femoral head is dislocated or significantly impacted. . Patient is  rotated. Unclear if this is anterior or posterior. No definite fracture. SI  joints symmetric. Pubic rami probably intact.      IMPRESSION: Left femoral head is dislocated or severely impacted. CT scan may  be helpful.       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 Consult IR  - Hepatitis/HIV Panel  2/20/17 AFP >16,000 consistent with a hepatocellular carcinoma. Liver biopsy done this morning - await path     Intractable pain left hip  - Case reviewed by Dr. Akira Orozco who did not recommend intervention at this time  - Started on PCA by primary team. May need to titrate up if pain uncontrolled  2/20/17 Increase PCA - in significant pain after biopsy/transfer from [facility]. Consult palliative care     Portal vein thrombosis  - Increase lovenox to treatment dose     HTN  - On cardene drip per primary  2/19/17 BP controlled on PO meds              Candace Barrios NP   [facility]  [id number] [place] [address]  Office : [phone]  Fax : [phone]     Attending Addendum:  I personally evaluated the patient with Indigo William N.P.,  and agree with the assessment, findings and plan as documented. We will adjust his pain meds and follow-up the results of his biopsy, he probably has metastatic [address]               Les Velez MD  [id number]  [address] [place] [address] Avenue  Office : [phone]  Fax : [phone]

## 2017-02-20 NOTE — PROGRESS NOTES
Gave pt 1mg bolus dose of dilaudid PCA per Dr. Mya Vargas who was in pt room when he returned from IR

## 2017-02-20 NOTE — PROGRESS NOTES
Interventional Radiology Prep Area Handoff      No Known Allergies    IRSummary reviewed. Pt identified with two identifiers. Verified procedure with Patient and IR Provider as liver biopsy.     Consent obtained: yes H&P complete/updated: yes MD airway complete: yes    Sedation:yes   NPO: yes   Anticoagulation: lovenox held     Pt shaved: n/a   Antibiotics: n/a  Anesthesia notified: n/a    Additional Notes: no      Lines:  Peripherally Inserted Central Catheter:     Central Venous Catheter:     Venous Access Device:     Peripheral Intravenous Line:  Peripheral IV 02/18/17 Left Antecubital (Active)   Site Assessment Clean, dry, & intact 2/20/2017  7:04 AM   Phlebitis Assessment 0 2/20/2017  7:04 AM   Infiltration Assessment 0 2/20/2017  7:04 AM   Dressing Status Clean, dry, & intact 2/20/2017  7:04 AM   Dressing Type Tape;Transparent 2/20/2017  7:04 AM   Hub Color/Line Status Flushed;Patent 2/20/2017  7:04 AM   Alcohol Cap Used No 2/20/2017  7:04 AM       Peripheral IV 02/18/17 (Active)   Site Assessment Clean, dry, & intact 2/20/2017  7:04 AM   Phlebitis Assessment 0 2/20/2017  7:04 AM   Infiltration Assessment 0 2/20/2017  7:04 AM   Dressing Status Clean, dry, & intact 2/20/2017  7:04 AM   Dressing Type Tape;Transparent 2/20/2017  7:04 AM   Hub Color/Line Status Flushed;Patent 2/20/2017  7:04 AM   Alcohol Cap Used No 2/20/2017  7:04 AM     Hemodialysis Catheter:     Drain(s):       Labs verified: yes  Recent Results (from the past 24 hour(s))   AFP, TUMOR MARKER    Collection Time: 02/20/17  5:10 AM   Result Value Ref Range    AFP, Tumor marker 33395.20 (H) <8.0 ng/mL   CANCER AG 19-9    Collection Time: 02/20/17  5:10 AM   Result Value Ref Range    Cancer antigen 19-9 6.90 2.0 - 37.0 U/mL   CEA    Collection Time: 02/20/17  5:10 AM   Result Value Ref Range    CEA 2.2 0.0 - 3.0 ng/mL     Patient Vitals for the past 8 hrs:   Temp Pulse Resp BP SpO2   02/20/17 0811 - (!) 103 18 143/65 92 %   02/20/17 8817 98.5 °F (36.9 °C) - - - -   02/20/17 0751 98.5 °F (36.9 °C) (!) 101 20 139/66 93 %   02/20/17 0402 100.3 °F (37.9 °C) 92 18 121/57 90 %   02/20/17 0025 99.2 °F (37.3 °C) 88 18 125/62 92 %

## 2017-02-20 NOTE — PROGRESS NOTES
END OF SHIFT NOTE:    Intake/Output  02/20 0701 - 02/20 1900  In: -   Out: 475 [Urine:475]   Voiding: YES  Catheter: NO  Drain:              Stool:  1 occurrences. Emesis:  0 occurrences. VITAL SIGNS  Patient Vitals for the past 12 hrs:   Temp Pulse Resp BP SpO2   02/20/17 1536 99.1 °F (37.3 °C) 100 20 122/60 96 %   02/20/17 1204 98.3 °F (36.8 °C) 94 22 127/62 94 %   02/20/17 0938 - - - 137/60 93 %   02/20/17 0934 - - - - 94 %   02/20/17 0933 - - - 122/61 -   02/20/17 0928 - - - 126/63 95 %   02/20/17 0918 - - - 122/63 94 %   02/20/17 0913 - - - 120/59 94 %   02/20/17 0908 - 93 18 129/59 94 %   02/20/17 0900 - 92 16 127/62 92 %   02/20/17 0855 - 95 22 129/64 93 %   02/20/17 0850 - 95 14 129/63 95 %   02/20/17 0845 - 96 14 136/68 96 %   02/20/17 0840 - 96 13 136/70 96 %   02/20/17 0835 - 96 17 137/64 96 %   02/20/17 0832 - 95 20 130/61 96 %   02/20/17 0811 - (!) 103 18 143/65 92 %   02/20/17 0753 98.5 °F (36.9 °C) - - - -   02/20/17 0751 98.5 °F (36.9 °C) (!) 101 20 139/66 93 %       Pain Assessment  Pain 1  Pain Scale 1: Numeric (0 - 10) (02/20/17 1715)  Pain Intensity 1: 9 (02/20/17 1715)  Patient Stated Pain Goal: 0 (02/20/17 1715)  Pain Reassessment 1: Yes (02/20/17 1404)  Pain Onset 1: pta (02/20/17 1715)  Pain Location 1: Hip (02/20/17 1715)  Pain Orientation 1: Left (02/20/17 1715)  Pain Description 1: Aching (02/20/17 1715)  Pain Intervention(s) 1: Medication (see MAR) (02/20/17 1715)    Ambulating  NO    Additional Information: pt resting in bed, PCD stopped, PRN meds ordered, See MAR. Shift report will be given to oncoming nurse at the bedside.     Claudia Bah RN

## 2017-02-21 VITALS
HEART RATE: 80 BPM | OXYGEN SATURATION: 94 % | SYSTOLIC BLOOD PRESSURE: 123 MMHG | WEIGHT: 139.77 LBS | DIASTOLIC BLOOD PRESSURE: 53 MMHG | BODY MASS INDEX: 20.01 KG/M2 | TEMPERATURE: 97.6 F | HEIGHT: 70 IN | RESPIRATION RATE: 18 BRPM

## 2017-02-21 PROBLEM — I81 PORTAL VEIN THROMBOSIS: Status: ACTIVE | Noted: 2017-02-21

## 2017-02-21 PROBLEM — R16.0 LIVER MASS, LEFT LOBE: Status: ACTIVE | Noted: 2017-02-21

## 2017-02-21 LAB
ALBUMIN SERPL BCP-MCNC: 2.3 G/DL (ref 3.5–5)
ALBUMIN/GLOB SERPL: 0.5 {RATIO} (ref 1.2–3.5)
ALP SERPL-CCNC: 170 U/L (ref 50–136)
ALT SERPL-CCNC: 75 U/L (ref 12–65)
ANION GAP BLD CALC-SCNC: 11 MMOL/L (ref 7–16)
AST SERPL W P-5'-P-CCNC: 121 U/L (ref 15–37)
BILIRUB SERPL-MCNC: 1.1 MG/DL (ref 0.2–1.1)
BUN SERPL-MCNC: 18 MG/DL (ref 6–23)
CALCIUM SERPL-MCNC: 8.7 MG/DL (ref 8.3–10.4)
CHLORIDE SERPL-SCNC: 99 MMOL/L (ref 98–107)
CO2 SERPL-SCNC: 26 MMOL/L (ref 21–32)
CREAT SERPL-MCNC: 0.73 MG/DL (ref 0.8–1.5)
ERYTHROCYTE [DISTWIDTH] IN BLOOD BY AUTOMATED COUNT: 16.3 % (ref 11.9–14.6)
GLOBULIN SER CALC-MCNC: 4.2 G/DL (ref 2.3–3.5)
GLUCOSE SERPL-MCNC: 87 MG/DL (ref 65–100)
HAV IGM SERPL QL IA: NEGATIVE
HBV CORE IGM SERPL QL IA: NEGATIVE
HBV SURFACE AG SERPL QL IA: NEGATIVE
HCT VFR BLD AUTO: 29.3 % (ref 41.1–50.3)
HCV AB S/CO SERPL IA: >11 S/CO RATIO (ref 0–0.9)
HGB BLD-MCNC: 9.1 G/DL (ref 13.6–17.2)
HIV 1+2 AB+HIV1 P24 AG SERPL QL IA: NON REACTIVE
MCH RBC QN AUTO: 29.4 PG (ref 26.1–32.9)
MCHC RBC AUTO-ENTMCNC: 31.1 G/DL (ref 31.4–35)
MCV RBC AUTO: 94.8 FL (ref 79.6–97.8)
PLATELET # BLD AUTO: 196 K/UL (ref 150–450)
PMV BLD AUTO: 9.8 FL (ref 10.8–14.1)
POTASSIUM SERPL-SCNC: 3.8 MMOL/L (ref 3.5–5.1)
PROT SERPL-MCNC: 6.5 G/DL (ref 6.3–8.2)
RBC # BLD AUTO: 3.09 M/UL (ref 4.23–5.67)
SODIUM SERPL-SCNC: 136 MMOL/L (ref 136–145)
WBC # BLD AUTO: 14.7 K/UL (ref 4.3–11.1)

## 2017-02-21 PROCEDURE — 80053 COMPREHEN METABOLIC PANEL: CPT | Performed by: INTERNAL MEDICINE

## 2017-02-21 PROCEDURE — 74011250637 HC RX REV CODE- 250/637: Performed by: INTERNAL MEDICINE

## 2017-02-21 PROCEDURE — 85027 COMPLETE CBC AUTOMATED: CPT | Performed by: INTERNAL MEDICINE

## 2017-02-21 PROCEDURE — 74011250636 HC RX REV CODE- 250/636: Performed by: NURSE PRACTITIONER

## 2017-02-21 PROCEDURE — 36415 COLL VENOUS BLD VENIPUNCTURE: CPT | Performed by: INTERNAL MEDICINE

## 2017-02-21 PROCEDURE — 74011250637 HC RX REV CODE- 250/637: Performed by: NURSE PRACTITIONER

## 2017-02-21 PROCEDURE — 97161 PT EVAL LOW COMPLEX 20 MIN: CPT

## 2017-02-21 RX ORDER — MORPHINE SULFATE 30 MG/1
30 TABLET ORAL EVERY 4 HOURS
Status: DISCONTINUED | OUTPATIENT
Start: 2017-02-21 | End: 2017-02-21 | Stop reason: HOSPADM

## 2017-02-21 RX ORDER — AMOXICILLIN 250 MG
2 CAPSULE ORAL DAILY
Qty: 60 TAB | Refills: 0 | Status: SHIPPED
Start: 2017-02-21

## 2017-02-21 RX ORDER — ENOXAPARIN SODIUM 100 MG/ML
90 INJECTION SUBCUTANEOUS DAILY
Qty: 1 SYRINGE | Refills: 0 | Status: SHIPPED | OUTPATIENT
Start: 2017-02-21

## 2017-02-21 RX ORDER — AMLODIPINE BESYLATE 5 MG/1
5 TABLET ORAL DAILY
Qty: 30 TAB | Refills: 0 | Status: SHIPPED | OUTPATIENT
Start: 2017-02-21

## 2017-02-21 RX ORDER — LABETALOL 100 MG/1
100 TABLET, FILM COATED ORAL 2 TIMES DAILY
Qty: 60 TAB | Refills: 0 | Status: SHIPPED | OUTPATIENT
Start: 2017-02-21

## 2017-02-21 RX ORDER — IBUPROFEN 400 MG/1
400 TABLET ORAL
Qty: 30 TAB | Refills: 0 | Status: SHIPPED
Start: 2017-02-21

## 2017-02-21 RX ORDER — MORPHINE SULFATE 30 MG/1
30 TABLET ORAL EVERY 4 HOURS
Qty: 42 TAB | Refills: 0 | Status: SHIPPED | OUTPATIENT
Start: 2017-02-21

## 2017-02-21 RX ADMIN — ENOXAPARIN SODIUM 60 MG: 60 INJECTION SUBCUTANEOUS at 06:52

## 2017-02-21 RX ADMIN — NAPROXEN 375 MG: 250 TABLET ORAL at 07:01

## 2017-02-21 RX ADMIN — LABETALOL HCL 100 MG: 100 TABLET, FILM COATED ORAL at 07:01

## 2017-02-21 RX ADMIN — STANDARDIZED SENNA CONCENTRATE AND DOCUSATE SODIUM 2 TABLET: 8.6; 5 TABLET, FILM COATED ORAL at 07:01

## 2017-02-21 RX ADMIN — MORPHINE SULFATE 15 MG: 15 TABLET ORAL at 01:35

## 2017-02-21 RX ADMIN — Medication 10 ML: at 14:00

## 2017-02-21 RX ADMIN — Medication 10 ML: at 06:00

## 2017-02-21 RX ADMIN — MORPHINE SULFATE 6 MG: 10 INJECTION INTRAMUSCULAR; INTRAVENOUS; SUBCUTANEOUS at 14:43

## 2017-02-21 RX ADMIN — AMLODIPINE BESYLATE 5 MG: 5 TABLET ORAL at 07:02

## 2017-02-21 RX ADMIN — NAPROXEN 375 MG: 250 TABLET ORAL at 10:55

## 2017-02-21 RX ADMIN — MORPHINE SULFATE 30 MG: 30 TABLET ORAL at 10:54

## 2017-02-21 RX ADMIN — ENOXAPARIN SODIUM 60 MG: 60 INJECTION SUBCUTANEOUS at 14:43

## 2017-02-21 RX ADMIN — MORPHINE SULFATE 6 MG: 10 INJECTION INTRAMUSCULAR; INTRAVENOUS; SUBCUTANEOUS at 08:30

## 2017-02-21 RX ADMIN — MORPHINE SULFATE 15 MG: 15 TABLET ORAL at 07:02

## 2017-02-21 NOTE — PROGRESS NOTES
Hospitalist Progress Note    2017  Admit Date: 2017 11:38 AM   NAME: Chata Kahn   :  1965   MRN:  [id number]   Attending: Carlos Padilla MD  PCP:  None    SUBJECTIVE:   As previously documented:  'Chata Kahn is a 50yoM with HTN (ran out of his unknown anti-HTN about 4 mths ago) who presented to [facility] ED after slipping at work and falling \"in the splits\". He came in with significant L hip pain and inability to bear weight. He has been having significant pain in the L hip for about 5mths and has been seeing a chiropractor about it for 2-3 weeks. He also has had associated numbness in his L foot for about a month and unsteadiness on his L leg for a few months. In the ED, he was found to have SBP in the 230s. Hip CT showed \"moth eaten lesion in the acetabulum\" concerning for metastatic disease. He endorses 30lb weight loss and night sweats for the last 6 mths. Denies SOB, CP, abd pain, nausea.'    17- still reports significant pain of L hip. Unable to rest well. Denies any new complaints. Liver bx pending.       Review of Systems negative with exception of pertinent positives noted above  PHYSICAL EXAM     Visit Vitals    /53 (BP 1 Location: Right arm, BP Patient Position: At rest)    Pulse 77    Temp 97.9 °F (36.6 °C)    Resp 18    Ht 5' 10\" (1.778 m)    Wt 63.4 kg (139 lb 12.4 oz)    SpO2 94%    BMI 20.06 kg/m2      Temp (24hrs), Av.4 °F (37.4 °C), Min:97.9 °F (36.6 °C), Max:101.9 °F (38.8 °C)    Oxygen Therapy  O2 Sat (%): 94 % (17 0402)  Pulse via Oximetry: 92 beats per minute (17 0938)  O2 Device: Room air (17)  O2 Flow Rate (L/min): 2 l/min (17 0908)  FIO2 (%): 21 % (17 0531)  ETCO2 (mmHg): 45 mmHg (17 0900)    Intake/Output Summary (Last 24 hours) at 17 0927  Last data filed at 17 1229   Gross per 24 hour   Intake                0 ml   Output              275 ml   Net             -275 ml      General: Appears uncomfortable at times   Lungs:  CTA Bilaterally. Heart:  Regular rate and rhythm,  No murmur, rub, or gallop  Abdomen: Soft, Non distended, Non tender, Positive bowel sounds  Extremities: No cyanosis, clubbing or edema  Neurologic:  No focal deficits    Recent Results (from the past 24 hour(s))   PROTHROMBIN TIME + INR    Collection Time: 02/20/17  3:13 PM   Result Value Ref Range    Prothrombin time 11.4 9.6 - 12.0 sec    INR 1.0 0.9 - 1.2     HGB & HCT    Collection Time: 02/20/17  3:13 PM   Result Value Ref Range    HGB 9.8 (L) 13.6 - 17.2 g/dL    HCT 31.5 (L) 41.1 - 50.3 %   HGB & HCT    Collection Time: 02/20/17 10:45 PM   Result Value Ref Range    HGB 9.5 (L) 13.6 - 17.2 g/dL    HCT 30.4 (L) 41.1 - 50.3 %   CBC W/O DIFF    Collection Time: 02/21/17  3:56 AM   Result Value Ref Range    WBC 14.7 (H) 4.3 - 11.1 K/uL    RBC 3.09 (L) 4.23 - 5.67 M/uL    HGB 9.1 (L) 13.6 - 17.2 g/dL    HCT 29.3 (L) 41.1 - 50.3 %    MCV 94.8 79.6 - 97.8 FL    MCH 29.4 26.1 - 32.9 PG    MCHC 31.1 (L) 31.4 - 35.0 g/dL    RDW 16.3 (H) 11.9 - 14.6 %    PLATELET 030 653 - 856 K/uL    MPV 9.8 (L) 10.8 - 86.6 FL   METABOLIC PANEL, COMPREHENSIVE    Collection Time: 02/21/17  3:56 AM   Result Value Ref Range    Sodium 136 136 - 145 mmol/L    Potassium 3.8 3.5 - 5.1 mmol/L    Chloride 99 98 - 107 mmol/L    CO2 26 21 - 32 mmol/L    Anion gap 11 7 - 16 mmol/L    Glucose 87 65 - 100 mg/dL    BUN 18 6 - 23 MG/DL    Creatinine 0.73 (L) 0.8 - 1.5 MG/DL    GFR est AA >60 >60 ml/min/1.73m2    GFR est non-AA >60 >60 ml/min/1.73m2    Calcium 8.7 8.3 - 10.4 MG/DL    Bilirubin, total 1.1 0.2 - 1.1 MG/DL    ALT (SGPT) 75 (H) 12 - 65 U/L    AST (SGOT) 121 (H) 15 - 37 U/L    Alk.  phosphatase 170 (H) 50 - 136 U/L    Protein, total 6.5 6.3 - 8.2 g/dL    Albumin 2.3 (L) 3.5 - 5.0 g/dL    Globulin 4.2 (H) 2.3 - 3.5 g/dL    A-G Ratio 0.5 (L) 1.2 - 3.5       US GUIDE BX SIN PERC   Final Result   Impression: Technically successful ultrasound guided left lobe liver mass core   biopsy. Specimen: Sent to cytopathology. Plan: The patient will recover for approximately 3 hours. He will be returned to   his hospital room during that time. CT CHEST ABD PELV W CONT   Final Result   IMPRESSION:   1. Infiltrative appearing mass within the left hepatic lobe with resultant   portal vein occlusion, left greater than right. A primary liver neoplasm   including hepatocellular carcinoma or cholangiocarcinoma could account for this   appearance in addition to metastatic disease. 2. Bony destructive process involving the left acetabulum with extension into   the pelvis compatible with metastatic disease. XR CHEST PORT   Final Result   Impression:   Deformity and suspected lucencies along the posterior margin left   sixth rib. Given the findings involving the left pelvis, the possibility of a   metastatic lesion would have to be considered. CT LOW EXT LT WO CONT   Final Result   IMPRESSION:  Moth-eaten lesion in the acetabulum with protrusio. Malignancy,   possibly metastatic disease suspected. Infection would be considered unlikely as   the femoral head remains intact. XR HIP LT W OR WO PELV 2-3 VWS   Final Result   IMPRESSION:  Left femoral head is dislocated or severely impacted. CT scan may   be helpful. ASSESSMENT      Active Hospital Problems    Diagnosis Date Noted    Liver mass, left lobe 02/21/2017     Likely malignancy. Biopsy results pending. 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 Discussed with PT, who will contact Dr. Jeremy Brooks team for further guidance. · L lobe liver mass- s/p biopsy 2/20/17.   · Discussed pain control with palliative care team.  Plan is to increase IR morphine to 30 mg q 4 hours scheduled. · Fever- occurred once. Likely related to underlying malignancy. No other signs/symptoms of infection. Monitor. · Portal vein thrombosis- tx dose lovenox. · Await heme/onc recommendations. Dispo:  ? Discharge to allow him to get back home to [place]. DVT Prophylaxis: Lovenox    Signed By: Oniel Patterson.  Callum Santiago MD     February 21, 2017

## 2017-02-21 NOTE — PROGRESS NOTES
Pt with T=101.9. Pt post IR liver bx. Called on-call and rec'd order for ibuprofen and gave to him at 2340. States he's not feeling chilled.   Trang Kinsey, RN

## 2017-02-21 NOTE — DISCHARGE INSTRUCTIONS
DISCHARGE SUMMARY from Nurse    The following personal items are in your possession at time of discharge:    Dental Appliances: None  Visual Aid: None     Home Medications: None  Jewelry: Ring, With patient  Clothing: At bedside, Footwear, Pants, Shirt, Socks, Alex  Other Valuables: At bedside, Cell Phone  Personal Items Sent to Safe: none          PATIENT INSTRUCTIONS:    After general anesthesia or intravenous sedation, for 24 hours or while taking prescription Narcotics:  · Limit your activities  · Do not drive and operate hazardous machinery  · Do not make important personal or business decisions  · Do  not drink alcoholic beverages  · If you have not urinated within 8 hours after discharge, please contact your surgeon on call. Report the following to your surgeon:  · Excessive pain, swelling, redness or odor of or around the surgical area  · Temperature over 100.5  · Nausea and vomiting lasting longer than 4 hours or if unable to take medications  · Any signs of decreased circulation or nerve impairment to extremity: change in color, persistent  numbness, tingling, coldness or increase pain  · Any questions        What to do at Home:  Recommended activity: Activity as tolerated, non weight bearing left leg. If you experience any of the following symptoms fever > 100.5, persistent nausea and vomiting, new or unrelieved pain, dizziness, chest pain, shortness of breath, elevated BP, please follow up with MD.      *  Please give a list of your current medications to your Primary Care Provider. *  Please update this list whenever your medications are discontinued, doses are      changed, or new medications (including over-the-counter products) are added. *  Please carry medication information at all times in case of emergency situations.           These are general instructions for a healthy lifestyle:    No smoking/ No tobacco products/ Avoid exposure to second hand smoke    Surgeon Apolonia Coulter Warning:  Quitting smoking now greatly reduces serious risk to your health. Obesity, smoking, and sedentary lifestyle greatly increases your risk for illness    A healthy diet, regular physical exercise & weight monitoring are important for maintaining a healthy lifestyle    You may be retaining fluid if you have a history of heart failure or if you experience any of the following symptoms:  Weight gain of 3 pounds or more overnight or 5 pounds in a week, increased swelling in our hands or feet or shortness of breath while lying flat in bed. Please call your doctor as soon as you notice any of these symptoms; do not wait until your next office visit. Recognize signs and symptoms of STROKE:    F-face looks uneven    A-arms unable to move or move unevenly    S-speech slurred or non-existent    T-time-call 911 as soon as signs and symptoms begin-DO NOT go       Back to bed or wait to see if you get better-TIME IS BRAIN. Warning Signs of HEART ATTACK     Call 911 if you have these symptoms:   Chest discomfort. Most heart attacks involve discomfort in the center of the chest that lasts more than a few minutes, or that goes away and comes back. It can feel like uncomfortable pressure, squeezing, fullness, or pain.  Discomfort in other areas of the upper body. Symptoms can include pain or discomfort in one or both arms, the back, neck, jaw, or stomach.  Shortness of breath with or without chest discomfort.  Other signs may include breaking out in a cold sweat, nausea, or lightheadedness. Don't wait more than five minutes to call 911 - MINUTES MATTER! Fast action can save your life. Calling 911 is almost always the fastest way to get lifesaving treatment. Emergency Medical Services staff can begin treatment when they arrive -- up to an hour sooner than if someone gets to the hospital by car. The discharge information has been reviewed with the patient.   The patient verbalized understanding. Discharge medications reviewed with the patient and appropriate educational materials and side effects teaching were provided.

## 2017-02-21 NOTE — PROGRESS NOTES
Discharge instructions and prescriptions provided and explained to patient, patient voiced understanding. Medication side effect sheet reviewed with pt. No home meds or valuables to return. Instructed pt on how to administer Lovenox injection. RN to let pt self administer before discharge. Pt currently waiting on medication vouchers nad walker. Opportunity for questions provided. Pt to be discharged after receiving items from  and receiving Lovenox injection.

## 2017-02-21 NOTE — PROGRESS NOTES
Problem: Mobility Impaired (Adult and Pediatric)  Goal: *Acute Goals and Plan of Care (Insert Text)  1. Mr. Marizol Levine will perform bed to chair independently in 3 days. 2. Mr. Marizol Levine will perform gait with rolling walker 50 ft independently NWB LLE in 3 days. PHYSICAL THERAPY: INITIAL ASSESSMENT 2/21/2017  INPATIENT: Hospital Day: 4  Payor: SELF PAY / Plan: [facility] SELF PAY / Product Type: Self Pay /      NAME/AGE/GENDER: Mina Day is a 47 y.o. male           PRIMARY DIAGNOSIS: Accelerated hypertension Accelerated hypertension Accelerated hypertension        ICD-10: Treatment Diagnosis:       · Generalized Muscle Weakness (M62.81)  · Difficulty in walking, Not elsewhere classified (R26.2)   Precaution/Allergies:  Review of patient's allergies indicates no known allergies. ASSESSMENT:      Mr. Marizol Levine presents with decreased mobility and decreased gait. Contacted Orthopedic services for d/c of bedrest and weight bearing status since hospitalist Dr. Deidra Zeng did not feel comfortable writing that order. He is NWB LLE. He really does well. He goes slow and he would not let me touch his leg but he did all activity including bed mobility transfer and gait with rolling walker with supervision maintaining NWB LLE. I know he wants to be back up in [place]. From what is observed today he could easily travel. Recommend rolling walker. This section established at most recent assessment   PROBLEM LIST (Impairments causing functional limitations):  1. Decreased Strength  2. Decreased Transfer Abilities  3. Decreased Ambulation Ability/Technique  4. Increased Pain  5. Decreased Activity Tolerance    INTERVENTIONS PLANNED: (Benefits and precautions of physical therapy have been discussed with the patient.)  1. Bed Mobility  2. Gait Training  3. Therapeutic Activites  4. Therapeutic Exercise/Strengthening  5.  Transfer Training      TREATMENT PLAN: Frequency/Duration: 3 times a week for duration of hospital stay  Rehabilitation Potential For Stated Goals: GOOD      RECOMMENDED REHABILITATION/EQUIPMENT: (at time of discharge pending progress): Continue Skilled Therapy. HISTORY:   History of Present Injury/Illness (Reason for Referral):  Saad Pichardo is a 50yoM with HTN (ran out of his unknown anti-HTN about 4 mths ago) who presented to [facility] ED after slipping at work and falling \"in the splits\". He came in with significant L hip pain and inability to bear weight. He has been having significant pain in the L hip for about 5mths and has been seeing a chiropractor about it for 2-3 weeks. He also has had associated numbness in his L foot for about a month and unsteadiness on his L leg for a few months. In the ED, he was found to have SBP in the 230s. Hip CT showed \"moth eaten lesion in the acetabulum\" concerning for metastatic disease. He endorses 30lb weight loss and night sweats for the last 6 mths. Denies SOB, CP, abd pain, nausea. Past Medical History/Comorbidities:   Mr. David Watt  has a past medical history of HTN (hypertension). Mr. David Watt  has no past surgical history on file. Social History/Living Environment:   Home Environment: Trailer/mobile home  # Steps to Enter: 4  One/Two Story Residence: One story  Living Alone: No  Support Systems: Family member(s)  Patient Expects to be Discharged to[de-identified] Unknown  Current DME Used/Available at Home: None  Prior Level of Function/Work/Activity:  Has been dealing with pain in left hip for several months now but has been able to continue to work. Personal Factors:          Age:  47 y.o.         Overall Behavior:  Agreeable and cooperative   Number of Personal Factors/Comorbidities that affect the Plan of Care: 1-2: MODERATE COMPLEXITY   EXAMINATION:   Most Recent Physical Functioning:   Gross Assessment:  AROM:  (left hip not assessed due to pain)  Strength:  (left hip not assessed due to pain.)               Posture:     Balance:  Sitting: Intact  Standing: Impaired; With support  Standing - Static: Good  Standing - Dynamic : Good Bed Mobility:  Rolling: Modified independent  Supine to Sit: Modified independent  Scooting: Independent  Wheelchair Mobility:     Transfers:  Sit to Stand: Supervision  Stand to Sit: Supervision  Gait:  Left Side Weight Bearing: Non-weight bearing  Step Length: Right shortened  Distance (ft): 3 Feet (ft)  Assistive Device: Walker, rolling  Ambulation - Level of Assistance: Supervision  Interventions: Verbal cues; Tactile cues; Safety awareness training;Manual cues       Body Structures Involved:  1. None Body Functions Affected:  1. Movement Related Activities and Participation Affected:  1. Mobility   Number of elements that affect the Plan of Care: 1-2: LOW COMPLEXITY   CLINICAL PRESENTATION:   Presentation: Stable and uncomplicated: LOW COMPLEXITY   CLINICAL DECISION MAKIN[address] [place] [id number] AM-PAC 6 Clicks   Basic Mobility Inpatient Short Form  How much difficulty does the patient currently have. .. Unable A Lot A Little None   1. Turning over in bed (including adjusting bedclothes, sheets and blankets)? [ ] 1   [ ] 2   [ ] 3   [X] 4   2. Sitting down on and standing up from a chair with arms ( e.g., wheelchair, bedside commode, etc.)   [ ] 1   [ ] 2   [X] 3   [ ] 4   3. Moving from lying on back to sitting on the side of the bed? [ ] 1   [ ] 2   [X] 3   [ ] 4   How much help from another person does the patient currently need. .. Total A Lot A Little None   4. Moving to and from a bed to a chair (including a wheelchair)? [ ] 1   [ ] 2   [X] 3   [ ] 4   5. Need to walk in hospital room? [ ] 1   [ ] 2   [X] 3   [ ] 4   6. Climbing 3-5 steps with a railing? [ ] 1   [ ] 2   [X] 3   [ ] 4   © , Trustees of [address] [place] [address] [address] [address], under license to [employer].  All rights reserved    Score:  Initial: 19 Most Recent: X (Date: -- )     Interpretation of Tool:  Represents activities that are increasingly more difficult (i.e. Bed mobility, Transfers, Gait). Score 24 23 22-20 19-15 14-10 9-7 6       Modifier CH CI CJ CK CL CM CN         · Mobility - Walking and Moving Around:               - CURRENT STATUS:    CK - 40%-59% impaired, limited or restricted               - GOAL STATUS:           CK - 40%-59% impaired, limited or restricted               - D/C STATUS:                       ---------------To be determined---------------  Payor: SELF PAY / Plan: [facility] SELF PAY / Product Type: Self Pay /       Medical Necessity:     · Patient is expected to demonstrate progress in functional technique to increase independence with mobility and gait. .  Reason for Services/Other Comments:  · Patient continues to require present interventions due to patient's inability to function at baseline. .   Use of outcome tool(s) and clinical judgement create a POC that gives a: Clear prediction of patient's progress: LOW COMPLEXITY                 TREATMENT:   (In addition to Assessment/Re-Assessment sessions the following treatments were rendered)   Pre-treatment Symptoms/Complaints:  Pain left hip  Pain: Initial:   Pain Intensity 1: 5  Pain Location 1: Hip  Pain Orientation 1: Left  Pain Intervention(s) 1: Position, Emotional support (RN just gave pain medicine.)  Post Session:  Felt better up in the chair. Assessment/Reassessment only, no treatment provided today     Braces/Orthotics/Lines/Etc:   · O2 Device: Room air  Treatment/Session Assessment:    · Response to Treatment:  good  · Interdisciplinary Collaboration:  · Physical Therapist  · Registered Nurse  · orthopedic NP, Dr. Lili Oviedo  · After treatment position/precautions:  · Up in chair  · Call light within reach  · RN notified  · Compliance with Program/Exercises: Will assess as treatment progresses. · Recommendations/Intent for next treatment session: \"Next visit will focus on advancements to more challenging activities and reduction in assistance provided\".   Total Treatment Duration:  PT Patient Time In/Time Out  Time In: 1050  Time Out: 1110     Safia Limon, PT

## 2017-02-21 NOTE — DISCHARGE SUMMARY
Hospitalist Discharge Summary     Patient ID:  Santa Rodriguez  [id number]  34 y.o.  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 Biopsy results pending. Portal vein thrombosis (2/21/2017)           HPI:  'Santa Rodriguez is a 50yoM with HTN (ran out of his unknown anti-HTN about 4 mths ago) who presented to [facility] ED after slipping at work and falling \"in the splits\". He came in with significant L hip pain and inability to bear weight. He has been having significant pain in the L hip for about 5mths and has been seeing a chiropractor about it for 2-3 weeks. He also has had associated numbness in his L foot for about a month and unsteadiness on his L leg for a few months. In the ED, he was found to have SBP in the 230s. Hip CT showed \"moth eaten lesion in the acetabulum\" concerning for metastatic disease. He endorses 30lb weight loss and night sweats for the last 6 mths. Denies SOB, CP, abd pain, nausea.'     Hospital Course:  He was admitted and evaluated further with a CT chest/abdomen/pelvis. This showed infiltrative L liver lobe mass and portal vein thrombosis. Had [address] guided liver biopsy performed 2/20/17 and results are pending. Had severe L hip pain during the hospitalization treated with morphine IR 30 mg every 4 hours. He was deemed non-weight bearing on L leg due to pathologic hip fracture. He could get around well with walker.   He was deemed stable for discharge and he wanted to return home to [place]. Of note, AFP tumor marker >16,000 and hepatitis C virus antibody positive. I spoke with Severo Callas at [facility] and she will have Dr. Joelle Guzman office contact him in 1 to 2 days for an appointment. Please refer to the admission H&P for details of presentation. In summary, the patient is     Significant Diagnostic Studies:       Labs: Results:       Chemistry Recent Labs      02/21/17   0356  02/19/17   0400  02/18/17   1535   GLU  87  122*  156*   NA  136  139  142   K  3.8  3.7  3.3*   CL  99  104  106   CO2  26  25  24   BUN  18  10  16   CREA  0.73*  0.58*  0.69*   CA  8.7  8.7  9.0   AGAP  11  10  12   AP  170*   --   216*   TP  6.5   --   7.4   ALB  2.3*   --   2.9*   GLOB  4.2*   --   4.5*   AGRAT  0.5*   --   0.6*      CBC w/Diff Recent Labs      02/21/17   0356  02/20/17   2245  02/20/17   1513  02/19/17   0400  02/18/17   1535   WBC  14.7*   --    --   12.7*  14.7*   RBC  3.09*   --    --   3.64*  3.74*   HGB  9.1*  9.5*  9.8*  11.0*  11.3*   HCT  29.3*  30.4*  31.5*  34.7*  35.2*   PLT  196   --    --   226  239   GRANS   --    --    --    --   82*   LYMPH   --    --    --    --   11*   EOS   --    --    --    --   0*      Cardiac Enzymes No results for input(s): CPK, CKND1, SEPIDEH in the last 72 hours. No lab exists for component: CKRMB, TROIP   Coagulation Recent Labs      02/20/17   1513   PTP  11.4   INR  1.0       Lipid Panel No results found for: CHOL, CHOLPOCT, CHOLX, CHLST, CHOLV, [id number], HDL, LDL, NLDLCT, DLDL, LDLC, DLDLP, 225363, VLDLC, VLDL, TGL, TGLX, TRIGL, [id number], TRIGP, TGLPOCT, [id number], CHHD, CHHDX   BNP No results for input(s): BNPP in the last 72 hours.    Liver Enzymes Recent Labs      02/21/17   0356   TP  6.5   ALB  2.3*   AP  170*   SGOT  121*      Thyroid Studies No results found for: T4, T3U, TSH, TSHEXT       US GUIDE BX SIN PERC   Final Result   Impression: Technically successful ultrasound guided left lobe liver mass core   biopsy. Specimen: Sent to cytopathology. Plan: The patient will recover for approximately 3 hours. He will be returned to   his hospital room during that time. CT CHEST ABD PELV W CONT   Final Result   IMPRESSION:   1. Infiltrative appearing mass within the left hepatic lobe with resultant   portal vein occlusion, left greater than right. A primary liver neoplasm   including hepatocellular carcinoma or cholangiocarcinoma could account for this   appearance in addition to metastatic disease. 2. Bony destructive process involving the left acetabulum with extension into   the pelvis compatible with metastatic disease. XR CHEST PORT   Final Result   Impression:   Deformity and suspected lucencies along the posterior margin left   sixth rib. Given the findings involving the left pelvis, the possibility of a   metastatic lesion would have to be considered. CT LOW EXT LT WO CONT   Final Result   IMPRESSION:  Moth-eaten lesion in the acetabulum with protrusio. Malignancy,   possibly metastatic disease suspected. Infection would be considered unlikely as   the femoral head remains intact. XR HIP LT W OR WO PELV 2-3 VWS   Final Result   IMPRESSION:  Left femoral head is dislocated or severely impacted. CT scan may   be helpful. Discharge Exam:  Visit Vitals    /53 (BP 1 Location: Right arm, BP Patient Position: At rest)    Pulse 80    Temp 97.6 °F (36.4 °C)    Resp 18    Ht 5' 10\" (1.778 m)    Wt 63.4 kg (139 lb 12.4 oz)    SpO2 94%    BMI 20.06 kg/m2     General appearance: alert, cooperative, uncomfortable appearing at times, appears older than stated age  Lungs: clear to auscultation bilaterally  Heart: regular rate and rhythm, S1, S2 normal, no murmur, click, rub or gallop  Abdomen: soft, non-tender.  Bowel sounds normal. No masses,  no organomegaly  Extremities: no cyanosis or edema  Neurologic: Grossly normal    Disposition: home  Discharge Condition: stable  Patient Instructions:   Current Discharge Medication List      START taking these medications    Details   amLODIPine (NORVASC) 5 mg tablet Take 1 Tab by mouth daily. Qty: 30 Tab, Refills: 0      ibuprofen (MOTRIN) 400 mg tablet Take 1 Tab by mouth every six (6) hours as needed. Indications: Fever  Qty: 30 Tab, Refills: 0      labetalol (NORMODYNE) 100 mg tablet Take 1 Tab by mouth two (2) times a day. Indications: hypertension  Qty: 60 Tab, Refills: 0      morphine IR (MS IR) 30 mg tablet Take 1 Tab by mouth every four (4) hours. Max Daily Amount: 180 mg. Indications: Severe Pain  Qty: 42 Tab, Refills: 0      senna-docusate (PERICOLACE) 8.6-50 mg per tablet Take 2 Tabs by mouth daily. Indications: Constipation  Qty: 60 Tab, Refills: 0      enoxaparin (LOVENOX) 60 mg/0.6 mL injection 90 mg by SubCUTAneous route daily. Indications: Portal vein thrombosis with presumed malignancy  Qty: 1 Syringe, Refills: 0         STOP taking these medications       HYDROcodone-acetaminophen (NORCO) 7.5-325 mg per tablet Comments:   Reason for Stopping:               Activity: Use walker and no weight bearing on L leg  Diet: Regular Diet    Prescription for morphine IR for 1 week provided at discharge. Follow-up  ·   [facility] in [place]. He is to be contacted by them in 1-2 days. Time spent to discharge patient 35 minutes  Signed:  Delfin Reddy.  Almaz Rico MD  2/21/2017  12:43 PM

## 2017-02-21 NOTE — PROGRESS NOTES
Palliative Care Progress Note    Patient: Moe Allen MRN: [id number]  SSN: [id number]    YOB: 1965  Age: 47 y.o. Sex: male       Assessment/Plan:     Chief Complaint/Interval History: ongoing hip pain       Principal Diagnosis:    · Pain, bone  M89.9    Additional Diagnoses:   · Debility, Unspecified  R53.81  · Fatigue, Lethargy  R53.83  · Pain, limb  M79.609  · Counseling, Encounter for Medical Advice  Z71.9  · Encounter for Palliative Care  Z51.5    Palliative Performance Scale (PPS)  PPS: 50    Medical Decision Making:   Reviewed and summarized notes over last 24 hours   Discussed case with appropriate providers- Dr Jarvis Billings; YAEL Minaya  Reviewed laboratory and x-ray data- CBC, BMP     Pt resting in bed, no distress noted. He reports marginal improvement in pain with rotation to Morphine. He reports the best the pain gets is  7/10, and that only lasts a short time. He admits to forgetting to ask for the pain medication. Will increase Morphine to 30 mg, and will schedule it every 4 hours. Continue Morphine 6 mg IV q 3 hours PRN. Biopsy results from liver biopsy are still pending. Discussed with Dr Jarvis Billings and Alexia Corona, [address] [place] [address]. Ultimately, pt will return to [place] when stable for discharge. Unsure if he is able to bear weight on this left leg- Dr Jarvis Billings to discuss with ortho. Will continue to follow. Will discuss findings with members of the interdisciplinary team.         More than 50% of this 25 minute visit was spent counseling and coordination of care as outlined above. Subjective:     Review of Systems:  A comprehensive review of systems was negative except for:   Constitutional: Positive for fatigue.   Musculoskeletal: Positive for ongoing left hip pain     Objective:     Visit Vitals    /53 (BP 1 Location: Right arm, BP Patient Position: At rest)    Pulse 77    Temp 97.9 °F (36.6 °C)    Resp 18    Ht 5' 10\" (1.778 m)    Wt 63.4 kg (139 lb 12.4 oz)    SpO2 94%    BMI 20.06 kg/m2       Physical Exam:    General:  Cooperative. Debilitated. No acute distress. Eyes:  Conjunctivae/corneas clear    Nose: Nares normal. Septum midline.    Neck: Supple, symmetrical, trachea midline   Lungs:   Clear to auscultation bilaterally, unlabored   Heart:  Regular rate and rhythm   Abdomen:   Soft, non-tender, non-distended   Extremities: Normal, atraumatic, no cyanosis or edema   Skin: Skin color, texture, turgor normal.   Neurologic: Nonfocal   Psych: Alert and oriented     Signed By: Lisa Sheppard NP     February 21, 2017